# Patient Record
Sex: MALE | Race: WHITE
[De-identification: names, ages, dates, MRNs, and addresses within clinical notes are randomized per-mention and may not be internally consistent; named-entity substitution may affect disease eponyms.]

---

## 2020-01-16 ENCOUNTER — HOSPITAL ENCOUNTER (INPATIENT)
Dept: HOSPITAL 95 - ER | Age: 80
LOS: 5 days | Discharge: HOME HEALTH | DRG: 917 | End: 2020-01-21
Attending: INTERNAL MEDICINE | Admitting: INTERNAL MEDICINE
Payer: MEDICARE

## 2020-01-16 VITALS — WEIGHT: 189.6 LBS | BODY MASS INDEX: 28.73 KG/M2 | HEIGHT: 68 IN

## 2020-01-16 DIAGNOSIS — R31.9: ICD-10-CM

## 2020-01-16 DIAGNOSIS — N40.1: ICD-10-CM

## 2020-01-16 DIAGNOSIS — N17.9: ICD-10-CM

## 2020-01-16 DIAGNOSIS — R25.1: ICD-10-CM

## 2020-01-16 DIAGNOSIS — T45.2X1A: Primary | ICD-10-CM

## 2020-01-16 DIAGNOSIS — E78.5: ICD-10-CM

## 2020-01-16 DIAGNOSIS — E83.52: ICD-10-CM

## 2020-01-16 DIAGNOSIS — R53.1: ICD-10-CM

## 2020-01-16 DIAGNOSIS — G93.41: ICD-10-CM

## 2020-01-16 DIAGNOSIS — N28.9: ICD-10-CM

## 2020-01-16 DIAGNOSIS — I35.0: ICD-10-CM

## 2020-01-16 LAB
ALBUMIN SERPL BCP-MCNC: 3.4 G/DL (ref 3.4–5)
ALBUMIN/GLOB SERPL: 1.1 {RATIO} (ref 0.8–1.8)
ALT SERPL W P-5'-P-CCNC: 29 U/L (ref 12–78)
AST SERPL W P-5'-P-CCNC: 29 U/L (ref 12–37)
BILIRUB DIRECT SERPL-MCNC: 0.2 MG/DL (ref 0–0.3)
BILIRUB INDIRECT SERPL-MCNC: 0.4 MG/DL (ref 0.1–0.7)
BILIRUB SERPL-MCNC: 0.6 MG/DL (ref 0.1–1)
GLOBULIN SER CALC-MCNC: 3.2 G/DL (ref 2.2–4)
LEUKOCYTE ESTERASE UR QL STRIP: (no result)
MAGNESIUM SERPL-MCNC: 2.7 MG/DL (ref 1.6–2.4)
PHOSPHATE SERPL-MCNC: 3.2 MG/DL (ref 2.5–4.9)
PROT SERPL-MCNC: 6.6 G/DL (ref 6.4–8.2)
PROT UR STRIP-MCNC: (no result) MG/DL
PSA SERPL-MCNC: 6.88 NG/ML (ref 0–4)
RBC #/AREA URNS HPF: (no result) /HPF (ref 0–2)
SP GR SPEC: 1.01 (ref 1–1.02)
UROBILINOGEN UR STRIP-MCNC: (no result) MG/DL

## 2020-01-16 PROCEDURE — G0008 ADMIN INFLUENZA VIRUS VAC: HCPCS

## 2020-01-16 PROCEDURE — A9270 NON-COVERED ITEM OR SERVICE: HCPCS

## 2020-01-16 PROCEDURE — G0103 PSA SCREENING: HCPCS

## 2020-01-16 NOTE — NUR
BED EXIT ALARM. PATIENT CONFUSED AND FIXED ON GETTING UP TO USE BR. PATIENT
REMINDED TO STAY IN BED. URINAL PROVIDED FOR BED AND PATIENT USING. ALSO
PULLED OFF GOWN AND TELEMETRY LEADS AND BOTH REPLACED. PATIENT NOT SURE WHERE
HIS WIFE WENT TO AND HE PREVIOUSLY TOLD ME SHE WENT HOME. INCREASED CONFUSION
AT THIS TIME. BED ALARM ACTIVATED AND CALL LIGHT IN REACH.

## 2020-01-16 NOTE — NUR
PATIENT WAS A NEW ADMIT FROM ED ON DAY SHIFT CHANGE. AXOX 3 WITH CONFUSION.
PATIENT ALREADY IN BED. BEDREST WITH WEAKNESS AND BASELINE HAND AND LEG
TREMORS. FAMILY PRESENT ON ADMIT. PATIENT HAD MULTIPLE BM'S ON ADMIT. NS
INFUSING  mL/HR FROM ED. REPORTED BACK PAIN AND TYLENOL GIVEN PER EMAR.
TELEMETRY PLACED AND TECH REPORTS ST W/PVC . BED ALARM ACTIVATED FOR
IMPULSIVENESS AND HX OF FALLS. FAMILY/PATIENT ORIENTED TO ROOM AND CALL LIGHT
SYSTEM. WILL CONTINUE TO MONITOR.

## 2020-01-16 NOTE — NUR
CONFUSED AND PULLED TELEMETRY AND GOWN OFF. REPLACING BOTH. NOT ABLE TO
REORIENT AT THIS TIME. WILL CONTINUE TO MONITOR

## 2020-01-17 LAB
ALBUMIN SERPL BCP-MCNC: 3.3 G/DL (ref 3.4–5)
ANION GAP SERPL CALCULATED.4IONS-SCNC: 10 MMOL/L (ref 6–16)
ANION GAP SERPL CALCULATED.4IONS-SCNC: 8 MMOL/L (ref 6–16)
BASOPHILS # BLD AUTO: 0.01 K/MM3 (ref 0–0.23)
BASOPHILS NFR BLD AUTO: 0 % (ref 0–2)
BUN SERPL-MCNC: 51 MG/DL (ref 8–24)
BUN SERPL-MCNC: 53 MG/DL (ref 8–24)
CALCIUM SERPL-MCNC: 11.9 MG/DL (ref 8.5–10.1)
CALCIUM SERPL-MCNC: 12.6 MG/DL (ref 8.5–10.1)
CHLORIDE SERPL-SCNC: 111 MMOL/L (ref 98–108)
CHLORIDE SERPL-SCNC: 113 MMOL/L (ref 98–108)
CO2 SERPL-SCNC: 19 MMOL/L (ref 21–32)
CO2 SERPL-SCNC: 20 MMOL/L (ref 21–32)
CREAT SERPL-MCNC: 4.98 MG/DL (ref 0.6–1.2)
CREAT SERPL-MCNC: 5.17 MG/DL (ref 0.6–1.2)
CREAT UR-MCNC: 44 MG/DL (ref 27–270)
DEPRECATED RDW RBC AUTO: 48.4 FL (ref 35.1–46.3)
EOSINOPHIL # BLD AUTO: 0.01 K/MM3 (ref 0–0.68)
EOSINOPHIL NFR BLD AUTO: 0 % (ref 0–6)
ERYTHROCYTE [DISTWIDTH] IN BLOOD BY AUTOMATED COUNT: 13.7 % (ref 11.7–14.2)
GLUCOSE SERPL-MCNC: 119 MG/DL (ref 70–99)
GLUCOSE SERPL-MCNC: 134 MG/DL (ref 70–99)
HCT VFR BLD AUTO: 36.8 % (ref 37–53)
HGB BLD-MCNC: 12 G/DL (ref 13.5–17.5)
IMM GRANULOCYTES # BLD AUTO: 0.04 K/MM3 (ref 0–0.1)
IMM GRANULOCYTES NFR BLD AUTO: 1 % (ref 0–1)
LEUKOCYTE ESTERASE UR QL STRIP: (no result)
LYMPHOCYTES # BLD AUTO: 1.04 K/MM3 (ref 0.84–5.2)
LYMPHOCYTES NFR BLD AUTO: 13 % (ref 21–46)
MCHC RBC AUTO-ENTMCNC: 32.6 G/DL (ref 31.5–36.5)
MCV RBC AUTO: 97 FL (ref 80–100)
MONOCYTES # BLD AUTO: 0.54 K/MM3 (ref 0.16–1.47)
MONOCYTES NFR BLD AUTO: 7 % (ref 4–13)
NEUTROPHILS # BLD AUTO: 6.44 K/MM3 (ref 1.96–9.15)
NEUTROPHILS NFR BLD AUTO: 80 % (ref 41–73)
NRBC # BLD AUTO: 0 K/MM3 (ref 0–0.02)
NRBC BLD AUTO-RTO: 0 /100 WBC (ref 0–0.2)
PHOSPHATE SERPL-MCNC: 3.4 MG/DL (ref 2.5–4.9)
PLATELET # BLD AUTO: 197 K/MM3 (ref 150–400)
POTASSIUM SERPL-SCNC: 4.2 MMOL/L (ref 3.5–5.5)
POTASSIUM SERPL-SCNC: 4.3 MMOL/L (ref 3.5–5.5)
PROT UR-MCNC: 17.8 MG/DL (ref 0–11.9)
SODIUM SERPL-SCNC: 139 MMOL/L (ref 136–145)
SODIUM SERPL-SCNC: 142 MMOL/L (ref 136–145)
SP GR SPEC: 1.01 (ref 1–1.02)
UROBILINOGEN UR STRIP-MCNC: (no result) MG/DL
WBC #/AREA URNS HPF: (no result) /HPF (ref 0–5)

## 2020-01-17 NOTE — NUR
BED EXIT ALARM AND PATIENT OUT OF BED WALKING. PULLED TELE LEADS OFF AND GOWN.
PATIENT BACK IN BED AND LEADS AND GOWN REPLACED. NOT ABLE TO ORIENT AT THIS
TIME. INCREASED CONFUSION. BED ALARM ACTIVATED.

## 2020-01-17 NOTE — NUR
SHIFT SUMMARY
 
PATIENT IS PLEASANT, VERY CONFUSED, PATIENTS FAMILY MEMBERS IN WITH THE
PATIENT AT THIS TIME. SHE IS VERY HELPFUL. HAVE TO KEEP THE PATIENT'S IV
WRAPPED AS AN "OUT OF SIGHT OUT OF MIND" SITUATION. SPOKE WITH DR. ELLIOTT
ABOUT HIS RED URINE. HE STATES THE ONLY THING WE CAN DO IS WATCH THE PATIENT'S
URINE AT THIS TIME. NO ACUTE CONCERNS, PATIENT WILL BE HERE AND AWAITING TEST
RESULTS. EXPECTED STAY UNTIL AT LEAST MONDAY.

## 2020-01-17 NOTE — NUR
Patient arrived to room 345 with family present.  Alert to self and family. no
complaints of discomfort. Able to verbalize 2 patient identifiers.

## 2020-01-17 NOTE — NUR
BED EXIT ALARM AND PATIENT ON SIDE OF BED. PULLED TELEMETRY OFF AND GOWN.
INCREASE AGITATION NOTED. PATIENT URINARY URGENCY WITH LASIX GIVEN IN ED.
PATIENT BACK IN BED AND TELE/GOWN REPLACED. USING URINAL IN BED. ATTENDS IN
PLACE. BED ALARM ACTIVATED. WILL CONTINUE TO MONITOR.

## 2020-01-17 NOTE — NUR
SHIFT SUMMARY
PATIENT HAVING INCREASED CONFUSION AND AGITATION. AXOX 2 AND BEDREST. BED EXIT
ALARM X FOUR PULLING TELE LEADS AND GOWN OFF EACH TIME. PIV REMAINS INTACT.
TELE TECH REPORTS ST W/PVC . BASELINE HANDS/LEGS TREMORS. REPORTED
CHRONIC BACK PAIN AND TYLENOL GIVEN PER EMAR. FAMILY REPORTED INCREASE
CONFUSION AND TREMORS SIX PLUS MONTHS AGO. CONSULT CALLED INTO DR WINKLER
ANSWERING SERVICE FOR AM. TAKES MEDICATION WHOLE WITH WATER. VSS/AFEBRILE.
DENIES SOB AND N/V. NS INFUSING  mL/HR AND HELD WHEN PATIENT CONFUSED
AND PULLING AT LINES, CORDS AND PIV. CALL LIGHT IN REACH. BED IN LOWEST
POSITION AND ALARM ACTIVATED. WILL CONTINUE TO MONITOR UNTIL DAY SHIFT NURSE
ASSUMES CARE.

## 2020-01-17 NOTE — NUR
PT IS REALLY HAVING DIFFICULTIES URINATING ON THE URINAL, STANDING AT BEDSIDE
AND UNSTEADY. CNA WOULD LIKE TO GET HIM IN THE SHOWER WITH A CHAIR, BUT
BEDBATH IS GOOD TOO.

## 2020-01-17 NOTE — NUR
pt tranferred to room 345 with ULISES Newsome assuming care. Family at bedside and
supportive, pagan patent draining blood tinged urine.

## 2020-01-18 LAB
ALBUMIN SERPL BCP-MCNC: 3.1 G/DL (ref 3.4–5)
ANION GAP SERPL CALCULATED.4IONS-SCNC: 9 MMOL/L (ref 6–16)
BASOPHILS # BLD AUTO: 0 K/MM3 (ref 0–0.23)
BASOPHILS NFR BLD AUTO: 0 % (ref 0–2)
BUN SERPL-MCNC: 47 MG/DL (ref 8–24)
CALCIUM SERPL-MCNC: 10.9 MG/DL (ref 8.5–10.1)
CHLORIDE SERPL-SCNC: 114 MMOL/L (ref 98–108)
CO2 SERPL-SCNC: 19 MMOL/L (ref 21–32)
CREAT SERPL-MCNC: 4.55 MG/DL (ref 0.6–1.2)
DEPRECATED RDW RBC AUTO: 48.4 FL (ref 35.1–46.3)
EOSINOPHIL # BLD AUTO: 0.01 K/MM3 (ref 0–0.68)
EOSINOPHIL NFR BLD AUTO: 0 % (ref 0–6)
ERYTHROCYTE [DISTWIDTH] IN BLOOD BY AUTOMATED COUNT: 13.8 % (ref 11.7–14.2)
GLUCOSE SERPL-MCNC: 107 MG/DL (ref 70–99)
HCT VFR BLD AUTO: 31.1 % (ref 37–53)
HGB BLD-MCNC: 10.3 G/DL (ref 13.5–17.5)
IMM GRANULOCYTES # BLD AUTO: 0.03 K/MM3 (ref 0–0.1)
IMM GRANULOCYTES NFR BLD AUTO: 0 % (ref 0–1)
LYMPHOCYTES # BLD AUTO: 0.93 K/MM3 (ref 0.84–5.2)
LYMPHOCYTES NFR BLD AUTO: 14 % (ref 21–46)
MCHC RBC AUTO-ENTMCNC: 33.1 G/DL (ref 31.5–36.5)
MCV RBC AUTO: 97 FL (ref 80–100)
MONOCYTES # BLD AUTO: 0.56 K/MM3 (ref 0.16–1.47)
MONOCYTES NFR BLD AUTO: 8 % (ref 4–13)
NEUTROPHILS # BLD AUTO: 5.3 K/MM3 (ref 1.96–9.15)
NEUTROPHILS NFR BLD AUTO: 78 % (ref 41–73)
NRBC # BLD AUTO: 0 K/MM3 (ref 0–0.02)
NRBC BLD AUTO-RTO: 0 /100 WBC (ref 0–0.2)
PHOSPHATE SERPL-MCNC: 3.4 MG/DL (ref 2.5–4.9)
PLATELET # BLD AUTO: 150 K/MM3 (ref 150–400)
POTASSIUM SERPL-SCNC: 3.9 MMOL/L (ref 3.5–5.5)
SODIUM SERPL-SCNC: 142 MMOL/L (ref 136–145)

## 2020-01-18 NOTE — NUR
NIGHT SHIFT SUMMARY
slept well first half of shift.  Had somewhat difficulty with patient pulling
at pagan when he was sleeping. The patient calls appropriately using call
light, but did occasionaly have difficulties following commands. As an
example, he would grasp the rails extremely when getting repositioned. Almost
looking frightened.  minor complaints of pain in low back which was relieved
with tramadol.

## 2020-01-18 NOTE — NUR
SUMMARY
 
PT RESTING QUIETLY IN BED, FAMILY AT THE BEDSIDE, THEY HAVE BEEN WITH THE PT
FOR MOST OF THE DAY, PT WAKES EASILY, HAS BEEN UP TO THE CHAIR FOR MEALS, CONT
TO BE MOSTLY CONFUSED, MED PER EMAR FOR PAIN, URINE IN THE CATHETER WAS PINK
AT THE START OF THE SHIFT, IT HAS CLEARED AND IS YELLOW NOW, PT/OT HAVE WORKED
WITH THE PT AND ARE RECOMMENDING SNF, FAMILY IS AGREEABLE AT THIS TIME, VSS,
NO ACUTE CHANGES, WILL CONT TO MONITOR

## 2020-01-19 LAB
ALBUMIN SERPL BCP-MCNC: 2.6 G/DL (ref 3.4–5)
ALBUMIN SERPL BCP-MCNC: 2.8 G/DL (ref 3.4–5)
ANION GAP SERPL CALCULATED.4IONS-SCNC: 10 MMOL/L (ref 6–16)
ANION GAP SERPL CALCULATED.4IONS-SCNC: 8 MMOL/L (ref 6–16)
BUN SERPL-MCNC: 50 MG/DL (ref 8–24)
BUN SERPL-MCNC: 51 MG/DL (ref 8–24)
CALCIUM SERPL-MCNC: 10.3 MG/DL (ref 8.5–10.1)
CALCIUM SERPL-MCNC: 10.4 MG/DL (ref 8.5–10.1)
CHLORIDE SERPL-SCNC: 112 MMOL/L (ref 98–108)
CHLORIDE SERPL-SCNC: 114 MMOL/L (ref 98–108)
CO2 SERPL-SCNC: 19 MMOL/L (ref 21–32)
CO2 SERPL-SCNC: 21 MMOL/L (ref 21–32)
CREAT SERPL-MCNC: 3.91 MG/DL (ref 0.6–1.2)
CREAT SERPL-MCNC: 3.98 MG/DL (ref 0.6–1.2)
GLUCOSE SERPL-MCNC: 123 MG/DL (ref 70–99)
GLUCOSE SERPL-MCNC: 139 MG/DL (ref 70–99)
PHOSPHATE SERPL-MCNC: 2.4 MG/DL (ref 2.5–4.9)
PHOSPHATE SERPL-MCNC: 3.1 MG/DL (ref 2.5–4.9)
POTASSIUM SERPL-SCNC: 4.1 MMOL/L (ref 3.5–5.5)
POTASSIUM SERPL-SCNC: 4.4 MMOL/L (ref 3.5–5.5)
SODIUM SERPL-SCNC: 141 MMOL/L (ref 136–145)
SODIUM SERPL-SCNC: 143 MMOL/L (ref 136–145)

## 2020-01-19 NOTE — NUR
SUMMARY
 
PT SITTING UP IN BED EATING DINNER WITH THE ASSISTANCE OF HIS SPOUSE, PT HAS
BEEN UP IN THE CHAIR TODAY, IS UP WITH 1-2 P ASSIST, PT WITH SOME TREMORS, MED
PER EMAR FOR PAIN, PT'S CATHETER WITH CLEAR URINE, NOT PINK TINGED TODAY, VSS,
NO ACUTE CHANGES, WILL CONT TO MONITOR

## 2020-01-19 NOTE — NUR
SHIFT SUMMARY:
VSS EXCEPT PT TACHYCARDIC . APICAL PULSE MANUALLY RECHECKED 100. PT
DENIES PAIN. A/O X 1. SOB WITH EXERTION. 02 SATS 90-93% ON RA. COUGHED WITH
PILLS BUT NOT WITH FLUIDS TONIGHT. PT PREFERS TO SLEEP WITH HOB ELEVATED >45
DEGREES.  SLIDES DOWN IN BED FREQUENTLY AND IN HIS CONFUSION, TRIES TO CRAWL
OUT OF THE SIDE OF THE BED. BECOMES AGITATED WHEN ATTEMPTS ARE MADE TO
REDIRECT BUT NO AGGRESSION. PT VERY THIRSTY, GULPS DOWN FLUIDS THROUGH STRAW
EACH TIME STAFF ARE IN WITH HIM. HAS SLEPT LITTLE. ULTRAM GIVEN FOR BACK PAIN
AT START OF SHIFT. PT CURRENTLY DENYING PAIN. FC PATENT, DRAINING CLEAR YELLOW
URINE AT START OF SHIFT, BUT PT PULLED ON TUBING AND URINE BECAME PINK TINGED.
BED LOW, CALL BUTTON IN REACH, BED ALARM ON.

## 2020-01-20 LAB
ANION GAP SERPL CALCULATED.4IONS-SCNC: 8 MMOL/L (ref 6–16)
BASOPHILS # BLD AUTO: 0.02 K/MM3 (ref 0–0.23)
BASOPHILS NFR BLD AUTO: 0 % (ref 0–2)
BUN SERPL-MCNC: 50 MG/DL (ref 8–24)
C3 SERPL-MCNC: 101 MG/DL (ref 82–167)
C4 SERPL-MCNC: 17 MG/DL (ref 14–44)
CALCIUM SERPL-MCNC: 10.5 MG/DL (ref 8.5–10.1)
CHLORIDE SERPL-SCNC: 114 MMOL/L (ref 98–108)
CO2 SERPL-SCNC: 20 MMOL/L (ref 21–32)
CREAT SERPL-MCNC: 3.81 MG/DL (ref 0.6–1.2)
DEPRECATED RDW RBC AUTO: 50.6 FL (ref 35.1–46.3)
EOSINOPHIL # BLD AUTO: 0.05 K/MM3 (ref 0–0.68)
EOSINOPHIL NFR BLD AUTO: 1 % (ref 0–6)
ERYTHROCYTE [DISTWIDTH] IN BLOOD BY AUTOMATED COUNT: 14.4 % (ref 11.7–14.2)
FERRITIN SERPL-MCNC: 244 NG/ML (ref 26–388)
GLUCOSE SERPL-MCNC: 98 MG/DL (ref 70–99)
HCT VFR BLD AUTO: 28.3 % (ref 37–53)
HGB BLD-MCNC: 9.2 G/DL (ref 13.5–17.5)
IMM GRANULOCYTES # BLD AUTO: 0.05 K/MM3 (ref 0–0.1)
IMM GRANULOCYTES NFR BLD AUTO: 1 % (ref 0–1)
LYMPHOCYTES # BLD AUTO: 1.03 K/MM3 (ref 0.84–5.2)
LYMPHOCYTES NFR BLD AUTO: 12 % (ref 21–46)
MAGNESIUM SERPL-MCNC: 1.8 MG/DL (ref 1.6–2.4)
MCHC RBC AUTO-ENTMCNC: 32.5 G/DL (ref 31.5–36.5)
MCV RBC AUTO: 97 FL (ref 80–100)
MONOCYTES # BLD AUTO: 0.52 K/MM3 (ref 0.16–1.47)
MONOCYTES NFR BLD AUTO: 6 % (ref 4–13)
NEUTROPHILS # BLD AUTO: 7.01 K/MM3 (ref 1.96–9.15)
NEUTROPHILS NFR BLD AUTO: 81 % (ref 41–73)
NRBC # BLD AUTO: 0 K/MM3 (ref 0–0.02)
NRBC BLD AUTO-RTO: 0 /100 WBC (ref 0–0.2)
PHOSPHATE SERPL-MCNC: 2.7 MG/DL (ref 2.5–4.9)
PLATELET # BLD AUTO: 134 K/MM3 (ref 150–400)
POTASSIUM SERPL-SCNC: 4.1 MMOL/L (ref 3.5–5.5)
SODIUM SERPL-SCNC: 142 MMOL/L (ref 136–145)
TIBC SERPL-MCNC: 193 UG/DL (ref 250–450)

## 2020-01-20 NOTE — NUR
Received verbal permission from patient's family to care for patient on
01/21/20. Patient was sleeping at the time.

## 2020-01-20 NOTE — NUR
SHIFT SUMMARY:
BETTER NIGHT TONIGHT. MOOD IMPROVED. LESS AGITATION AND CONFUSION. PT TRIED TO
CRAWL OUT OF BED AND WAS DISCOVERED TO HAVE A WET BRIEF WITH F/C IN PLACE.
CATH PATENT AND DRAINING YELLOW URINE. 300 CC'S URINE OUT IN FC AFTER WET
BRIEF INCIDENT. CATH APPEARS TO BE PLACED PROPERLY. DENIES PAIN. SLEPT MOST OF
THE NIGHT. NO ACUTE CHANGES. BED LOW, CALL BUTTON IN REACH, BED ALARM ON.

## 2020-01-20 NOTE — NUR
*HUMPHREY REMOVED*
PT CONTINUED TO PULL AT HUMPHREY DUE TO PAIN/ DISCOMFORT. HUMPHREY REMOVED BY THIS
RN BEFORE PT PULLED IT OUT AND INJURING SELF. ATTENDS HAS BEEN PLACED. PT HAS
BEEN VOIDING IN URINAL AND SOME INCOTIENT EPISODES. URINE IS STILL PINK. WILL
CONTINUE TO MONITOR. PT REPORTS INCREASE IN COMFORT.

## 2020-01-20 NOTE — NUR
PT AOX2 AND COOPERATIVE OF CARE TODAY. PT DOES HAVE CONFUSION, BUT HAS BEEN
PLEASANT ALL CRISTINA. PT TALKING AND APPROPRIATE WITH CARE. PT UP IN CHAIR AND UP
TO BEDSIDE COMMODE AS A 1 PERSON ASSIST WITH GAIT BELT AND WALKER. PT'S HUMPHREY
HAS BEEN RED OUTPUT ALL DAY TODAY. PT STARTED TO FEEL SOME DISCOMFORT AND DR WINKLER ORDERED HUMPHREY TO BE FLUSHED AND THEN MONITOR COLOR OF URINE WELL. IF
URINE CLEAR FOR TWO HOURS HUMPHREY IS TO BE REMOVED. PT TOLERATED FLUSH WELL AND
HUMPHREY APPREARED TO HAVE SOME CLOTS AS IT BEGAN TO FLOW BETTER. PT STATES IT
BURNS A BIT, BUT PT WILL PULL ON IT OFF AND ON. PT RESTING IN BED AT THIS TIME
WILL CONTINUE TO MONITOR.

## 2020-01-20 NOTE — NUR
Initial Visit:
 
Palliative Care Consult for AD/POLST.
 
Spoke with [facility] Jimenez earlier this AM, discussed case and listened to
concerns.
 
Pt resting in bed and denies pain at this time. Pt is A&OX2. Pt unable to give
appropriate reason for hospital stay and current year. Pt denies dyspnea at
this time.
 
Pt's wife Klarissa and Pt's daughter Suad are at bedside. During visit Pt
appears mildly anxious and intermittently pushes linen down to observe his
Ca Catheter. Pt reports intermittent burning of catheter. Bedside RN Jaja
aware with her educating Pt and family regarding discomfort and Ca
Catheter. Engaged in therapeutic discussion regarding AD/POLST and some
advanced care planning. Discussed current code status with Pt's wife
confirming Pt's wishes to not receive CPR and does not want to be intubated.
Discussed completing a POLST and educated on life sustaining measures, risk
factors, and medical interventions to consider when completing POLST. Wife
Klairssa reports she is still trying to process information from Hospitalist and
cardiac specialist. She reports she will consider completing at a later time.
 
Pt's daughter Suad report her dad (Pt) and mom recently moved to [place] from
[place] and lived off the [facility] for 18 years. Pt's wife Klarissa report just her and
Pt living together with Klarissa being the primary caregiver for Pt. Educated on
the importance of planinng for the future as disease process takes its coarse.
Discussed the importance of establishing with a PCP and the importance for
routine discussions regarding disease process in order to plan accordingly.
Discussed the possibility of needing additional assistance with Pt care needs
in the home or higher level of care as disease process takes its coarse.
Provided Palliative Care contact information and instructed to call with any
questions or concerns. Family expressess appreciaiton of visit.
 
Palliative Care will remain available.

## 2020-01-21 LAB
ALBUMIN SERPL BCP-MCNC: 2.5 G/DL (ref 3.4–5)
ALBUMIN SERPL-MCNC: 3.1 G/DL (ref 2.9–4.4)
ALBUMIN/GLOB SERPL: 1.2 {RATIO} (ref 0.7–1.7)
ALPHA1 GLOB SERPL ELPH-MCNC: 0.3 G/DL (ref 0–0.4)
ALPHA2 GLOB SERPL ELPH-MCNC: 0.9 G/DL (ref 0.4–1)
ANION GAP SERPL CALCULATED.4IONS-SCNC: 8 MMOL/L (ref 6–16)
ANTI-DSDNA ANTIBODIES: <1 IU/ML (ref 0–9)
B-GLOBULIN SERPL ELPH-MCNC: 0.7 G/DL (ref 0.7–1.3)
BUN SERPL-MCNC: 52 MG/DL (ref 8–24)
CALCIUM SERPL-MCNC: 10.1 MG/DL (ref 8.5–10.1)
CHLORIDE SERPL-SCNC: 113 MMOL/L (ref 98–108)
CO2 SERPL-SCNC: 21 MMOL/L (ref 21–32)
CREAT SERPL-MCNC: 3.36 MG/DL (ref 0.6–1.2)
ENA RNP AB SER-ACNC: <0.2 AI (ref 0–0.9)
ENA SM AB SER-ACNC: <0.2 AI (ref 0–0.9)
ENA SS-A AB SER-ACNC: <0.2 AI (ref 0–0.9)
ENA SS-B AB SER-ACNC: <0.2 AI (ref 0–0.9)
GAMMA GLOB SERPL ELPH-MCNC: 0.8 G/DL (ref 0.4–1.8)
GLOBULIN SER CALC-MCNC: 2.7 G/DL (ref 2.2–3.9)
GLUCOSE SERPL-MCNC: 98 MG/DL (ref 70–99)
HCT VFR BLD AUTO: 26.1 % (ref 37–53)
HGB BLD-MCNC: 8.8 G/DL (ref 13.5–17.5)
IGG SERPL-MCNC: 846 MG/DL (ref 700–1600)
IGM SERPL-MCNC: 56 MG/DL (ref 15–143)
PHOSPHATE SERPL-MCNC: 1.9 MG/DL (ref 2.5–4.9)
POTASSIUM SERPL-SCNC: 4.1 MMOL/L (ref 3.5–5.5)
PROT SERPL-MCNC: 5.8 G/DL (ref 6–8.5)
SODIUM SERPL-SCNC: 142 MMOL/L (ref 136–145)

## 2020-01-21 NOTE — NUR
PT DISCHARGED HOME WITH HOME HEALTH. PT DOING WELL TODAY AND HAS BEEN VOIDING
WELL. URINE STILL HAS LIGHTENED UP, BUT IS STILL RED. PT HAD FAMILY TO
TRANSPORT HOME. OT AND PHYSICAL THERAPY WERE ABLE TO WORK WITH PT AND HIS WIFE
ON SAFE TRANSFERING AT HOME. ALL PAPERS REVIEWED AND EDUCATIONAL MATERIAL SENT
HOME. NO DISTRESS NOTED. IVs REMOVED PRIOR TO DISCHARGE. THIS WRITER ESCORTED
PT DOWN TO N ENTRANCE VIA WHEEL CHAIR.

## 2020-01-21 NOTE — NUR
Spoke with [facility], discussed
case and plan. Plan for Pt to discharge today with home health.
 
Pt resting in bed upon arrival. Pt's wife Klarissa at bedside. Discussed plan and
Klarissa is agreeable. She reports Pt does not want to go to SNF and preferrs
home health. Educated Klarissa on the possibility of Pt continuing being a fall
risk. Re-enforced education on disease process. No other concerns reported at
this time. Klarissa expresses appreciation of visit.
 
Palliative Care will remain available.

## 2020-01-21 NOTE — NUR
SHIFT SUMMARY
PT HUMPHREY REMOVED DUE TO DISCOMFORT AND PT REPEATEDLY TRYING TO REMOVE IT. PT
HAS BEEN VOIDING WITH HELP IN URINAL. PT IS HAVING SOME INCONTIENCE NOTED. PT
HAS BEEN SLEEPING OFF AND ON. PT HAD SOME BM NOTED AS WELL. PT HAD NO
COMPLAINTS SINCE HUMPHREY REMOVED. PT HAS LIGHT PINK URINE NOTED AND IS
IMPROVING. PT CURRENTLY SLEEPING AND IN NO DISTRESS. CALL LIGHT IN REACH AND
BED ALARM ON.

## 2020-01-22 LAB
ANTIPROTEINASE 3 (PR-3) ABS: <3.5 U/ML (ref 0–3.5)
ATYPICAL PANCA: (no result) TITER
CYTOPLASMIC (C-ANCA): (no result) TITER
MYELOPEROXIDASE AB SER IA-ACNC: <9 U/ML (ref 0–9)
PERINUCLEAR (P-ANCA): (no result) TITER

## 2020-06-22 ENCOUNTER — HOSPITAL ENCOUNTER (OUTPATIENT)
Dept: HOSPITAL 95 - MHTC | Age: 80
Setting detail: OBSERVATION
LOS: 1 days | Discharge: HOME | End: 2020-06-23
Attending: INTERNAL MEDICINE | Admitting: INTERNAL MEDICINE
Payer: MEDICARE

## 2020-06-22 VITALS — BODY MASS INDEX: 30.76 KG/M2 | HEIGHT: 67.01 IN | WEIGHT: 195.99 LBS

## 2020-06-22 DIAGNOSIS — Z87.891: ICD-10-CM

## 2020-06-22 DIAGNOSIS — Z01.810: Primary | ICD-10-CM

## 2020-06-22 DIAGNOSIS — Z79.899: ICD-10-CM
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
## 2020-06-22 DIAGNOSIS — E78.5: ICD-10-CM

## 2020-06-22 DIAGNOSIS — E83.52: ICD-10-CM

## 2020-06-22 DIAGNOSIS — I25.10: ICD-10-CM

## 2020-06-22 DIAGNOSIS — I10: ICD-10-CM

## 2020-06-22 DIAGNOSIS — Z79.82: ICD-10-CM

## 2020-06-22 DIAGNOSIS — I35.0: ICD-10-CM

## 2020-06-22 DIAGNOSIS — N17.9: ICD-10-CM

## 2020-06-22 DIAGNOSIS — T82.855A: ICD-10-CM

## 2020-06-22 LAB
LEUKOCYTE ESTERASE UR QL STRIP: (no result)
PROT UR STRIP-MCNC: (no result) MG/DL
RBC #/AREA URNS HPF: (no result) /HPF (ref 0–2)
SP GR SPEC: 1 (ref 1–1.02)
UROBILINOGEN UR STRIP-MCNC: (no result) MG/DL

## 2020-06-22 PROCEDURE — G0378 HOSPITAL OBSERVATION PER HR: HCPCS

## 2020-06-22 PROCEDURE — C1769 GUIDE WIRE: HCPCS

## 2020-06-22 PROCEDURE — C1887 CATHETER, GUIDING: HCPCS

## 2020-06-22 PROCEDURE — C1874 STENT, COATED/COV W/DEL SYS: HCPCS

## 2020-06-22 PROCEDURE — C1725 CATH, TRANSLUMIN NON-LASER: HCPCS

## 2020-06-22 PROCEDURE — C9600 PERC DRUG-EL COR STENT SING: HCPCS

## 2020-06-22 PROCEDURE — C1894 INTRO/SHEATH, NON-LASER: HCPCS

## 2020-06-22 PROCEDURE — G0278 ILIAC ART ANGIO,CARDIAC CATH: HCPCS

## 2020-06-22 NOTE — NUR
1525 DR. DOUGLAS AT THE BEDSIDE FOR GROIN EVALUATION. HUMPHREY CATHETER PLACED
AND THEN PREPPED TO PULL SHEATH. 350 ML+ UOP NOTED, CLEAR YELLOW URINE.

## 2020-06-22 NOTE — NUR
SHIFT SUMMARY:
Pt arrived to room pcu 13 around 1730. Pt and his wife oriented to room, unit,
call light, POC. Denies questions. Pt has hx underling dementia. Bed alarm
placed on and pt and wife educated. Pt Laying flat. R groin site with krista
patch in place. Fem stop in place but not inflated. At arrival to room pt had
a quarter size bloody cori on krista patch. Pt groin site soft and no
hematoma noted at that time. Pt did and continues to have oozing at pagan cath
insertion site. VSS since arrival to unit. Pt was given finger food tray and
was assisted with meal by his wife.
At this time pt is resting comfortably. Krista patch is now saturated but
still no hematoma noted. Physician was notified of this. No other needs or
concerns at this time. Report was given to night RN and care transfered.

## 2020-06-22 NOTE — NUR
1501 REVEIVED SBAR FROM ULISES MARTINEZ AT THE BEDSIDE, RIGHT GROIN SITE
EVALUATED. PREPARED TO PULL ACT. PATIENT WITH WIFE AT THE BEDSIDE. PATIENT
TRYING TO URINATE.

## 2020-06-22 NOTE — NUR
PT RECEIVED 10 MG IV HYDRALZINE FOR CONTINUED ELEVATED B/PS'.  PT'S SITE
REMAINS SWOLLEEN, BUT SOFT TRACK OOZING PRESENT, MAPPED ON KAYLEE.  PT DENIES
PAIN AT SITE.

## 2020-06-22 NOTE — NUR
PT CONTINUES TO HAVE SLOW 00ZE, BUT SITE UNCHANGED; LIGHT PRESSURE X 5 MIN,
KAYLEE AND TEGADERM CHANGED AND FEMSTOP PLACED-BUT NOT INFLATED. DR DOUGLAS NOTIFIED OF SLOW OOZE, OK WITH PLAN.

## 2020-06-22 NOTE — NUR
UPDATE
PATIENT'S KAYLEE PATCH TO RIGHT GROIN IS COMPLETLEY SOAKED IN BLOOD. KAYLEE
PATCH CHANGED OUT AT APPROX 2005. WILL CONTINUE TO MONITOR. VITAL SIGNS AS
CHARTED. PATIENT DENIES ANY PAIN OR NUMBNESS AND TINGLING AT THIS TIME.

## 2020-06-22 NOTE — NUR
UPDATE
[facility] LAB STATED THEY ALREADY HAD A UA IN THE
LAB FROM [facility] AND THEY WERE WAITING FOR THE ORDER TO BE COLLECTED.

## 2020-06-22 NOTE — NUR
1535 SHEATH PULLED AT 1535 AND KAYLEE PAD AND MANUAL PRESSURE HELD. WIFE TO
THE WAITING ROOM PRIOR TO SHEATH PULL. URINE DRAINING FROM THE HUMPHREY. MONITOR
ON Q 5 MINUTES VVS. PATIENT TOLERATING WELL.

## 2020-06-22 NOTE — NUR
MANUAL PRESSURE HELD FOR 25 MIN, R GROIN SITE SWOLLEEN, BUT SOFT, +2 PULSES X
2, KAYLEE AND TEGADERM DRSG APPLIED.

## 2020-06-22 NOTE — NUR
UPDATE
BLOOD IS STILL WITHIN THE OUTLINE. NO NEW BLEEDING NOTED AT THIS TIME. DOSE OF
ASPARIN GIVEN PER DR FISHER'S ORDERS SINCE PATIENT HAS NOT TAKEN MEDICATION PER
WIFE REPORT.

## 2020-06-22 NOTE — NUR
UPDATE
AT APPROX 2110 PATIENT'S KAYLEE PATCH WAS SOAKED THROUGH WITH BLOOD ONCE
AGAIN. KAYLEE PATCH REPLACED AND APPROX 10 MINUTES OF MANUAL PRESSURE WAS
HELD TO SITE. SPOT OF BLOOD ON SITE WAS OUTLINED AFTER PRESSURE WAS HELD.
APPROX 20 MINUTES AFTER PRESSURE WAS HELD SITE WAS CHECKED AND BLOOD HAD NOT
PASSED THE INITAL OUTLINE. DR FISHER NOTIFIED AND STATED TO RESECHULE THE PLAVIX
FOR 0900 TOMORROW MORNING AND TO GIVE ASPARIN DOSE IF PATIENT HAS NOT HAD IT
YET TODAY. IF HE HAS, HOLD TONIGHT'S DOSE.

## 2020-06-23 LAB
ANION GAP SERPL CALCULATED.4IONS-SCNC: 6 MMOL/L (ref 6–16)
BASOPHILS # BLD AUTO: 0.02 K/MM3 (ref 0–0.23)
BASOPHILS NFR BLD AUTO: 0 % (ref 0–2)
BUN SERPL-MCNC: 26 MG/DL (ref 8–24)
CALCIUM SERPL-MCNC: 9.7 MG/DL (ref 8.5–10.1)
CHLORIDE SERPL-SCNC: 113 MMOL/L (ref 98–108)
CO2 SERPL-SCNC: 25 MMOL/L (ref 21–32)
CREAT SERPL-MCNC: 1.24 MG/DL (ref 0.6–1.2)
DEPRECATED RDW RBC AUTO: 44 FL (ref 35.1–46.3)
EOSINOPHIL # BLD AUTO: 0.05 K/MM3 (ref 0–0.68)
EOSINOPHIL NFR BLD AUTO: 1 % (ref 0–6)
ERYTHROCYTE [DISTWIDTH] IN BLOOD BY AUTOMATED COUNT: 13.2 % (ref 11.7–14.2)
GLUCOSE SERPL-MCNC: 134 MG/DL (ref 70–99)
HCT VFR BLD AUTO: 35.7 % (ref 37–53)
HGB BLD-MCNC: 11.2 G/DL (ref 13.5–17.5)
IMM GRANULOCYTES # BLD AUTO: 0.01 K/MM3 (ref 0–0.1)
IMM GRANULOCYTES NFR BLD AUTO: 0 % (ref 0–1)
LYMPHOCYTES # BLD AUTO: 0.94 K/MM3 (ref 0.84–5.2)
LYMPHOCYTES NFR BLD AUTO: 16 % (ref 21–46)
MCHC RBC AUTO-ENTMCNC: 31.4 G/DL (ref 31.5–36.5)
MCV RBC AUTO: 91 FL (ref 80–100)
MONOCYTES # BLD AUTO: 0.67 K/MM3 (ref 0.16–1.47)
MONOCYTES NFR BLD AUTO: 11 % (ref 4–13)
NEUTROPHILS # BLD AUTO: 4.27 K/MM3 (ref 1.96–9.15)
NEUTROPHILS NFR BLD AUTO: 72 % (ref 41–73)
NRBC # BLD AUTO: 0 K/MM3 (ref 0–0.02)
NRBC BLD AUTO-RTO: 0 /100 WBC (ref 0–0.2)
PLATELET # BLD AUTO: 191 K/MM3 (ref 150–400)
POTASSIUM SERPL-SCNC: 3.9 MMOL/L (ref 3.5–5.5)
SODIUM SERPL-SCNC: 144 MMOL/L (ref 136–145)

## 2020-06-23 NOTE — NUR
PCU DISCHARGE SUMMARY
PATIENT ALERT AND ORIENTED TO SELF AND LOCATION - CONFUSED TO DATE/TIME AND
SITUATION. WIFE AT BEDSIDE - CAREGIVER TO PATIENT. WIFE IS VERY KNOWLEDGEABLE
REGARDING PATIENTS CARE AND HOME MEDICAITON REGIMENT. PATIENT REMAINS ON ROOM
AIR - SINUS TO SINUS MYA PER CARDIAC MONITOR.  RIGHT GROIN SITE NOTED -
ASSESSED WITH NOC SHIFT RN AT BEGINNING OF SHIFT (RINDY) NO CHANGE TO GROIN
SITE PER NOC SHIFT RN - SOFT AROUND SITE WITH NO S/SX OF HEMATOMA - MILD
BRUISING AND SOFT SWELLING NOTED. KAYLEE IN PLACE WITH TEGADERM OVER. NO NEW
BLEEDING NOTED. PATIENT AMBULATED DOWN ARROYO 100+ FEET, TOLERATED WELL WITH
CANE (BASELINE PER WIFE) PATIENT STAYED ANOTHER HOUR AFTER AMBULATION AND SITE
REMAINS INTACT WITH NO NEW BLEEDING OR HEMATOMA NOTED. HUMPHREY CATH PULLED.
PATIENT VOIDED YELLOW URINE POST HUMPHREY REMOVAL. PATIENT LEFT UNIT VIA
WHEELCHAIR HOME WITH WIFE. WIFE DENIES ANY QUESTIONS OR CONCERNS. WRITTEN AND
VERBAL POST GROIN SITE INSTRUCTIONS DISCUSSED AND GIVEN TO PATIENT. BELONGINGS
SENT WITH PATIENT.

## 2020-06-23 NOTE — NUR
SHIFT SUMMARY
PATIENT APPEARED TO BE AWAKE MOST OF THE NIGHT. PATIENT MOSTLY PLEASENT AND
COOPERATIVE WITH CARE THROUGHOUT THE NIGHT, HOWEVER, PATIENT DID GET
FRUSTRATED AND SLIGHTLY AGITATED DUE TO IT BEING "TOO NOISEY HERE." PATIENT
PROVIED WITH EAR PLUGS MULTIPLE TIMES THROUGHOUT THE NIGHT BUT APPEARS TO
LOSE THEM QUICKLY. PATIENT FIDGITY AND HAS PULLED OFF HIS TELE SEVERAL TIMES
TONIGHT. PATIENT'S RIGHT GROIN SITE IS SOFT TO THE TOUCH. RED DRAINAGE HAS
LEAKED SLIGHTLY PASSED THE ORIGINAL OUTLINE DRAWN ON THE KAYLEE PATCH AT
APPROX 2110 LAST NIGHT. PATIENT FORGETFUL ABOUT MOVEMENT RESTRICTIONS DUE TO
GROIN SITE. WIFE AT THE BEDSIDE THROUGHOUT THE NIGHT AND HELPED TO CALM
PATIENT WHEN HE BECAME AGITATED. WILL CONTINUE TO MONITOR PATIENT AND REPORT
TO ONCOMING RN.

## 2021-03-02 ENCOUNTER — HOSPITAL ENCOUNTER (EMERGENCY)
Dept: HOSPITAL 95 - ER | Age: 81
Discharge: HOME | End: 2021-03-02
Payer: MEDICARE

## 2021-03-02 VITALS — WEIGHT: 189.99 LBS | HEIGHT: 67 IN | BODY MASS INDEX: 29.82 KG/M2

## 2021-03-02 DIAGNOSIS — Z79.899: ICD-10-CM

## 2021-03-02 DIAGNOSIS — Z79.82: ICD-10-CM

## 2021-03-02 DIAGNOSIS — R10.31: Primary | ICD-10-CM

## 2021-03-02 DIAGNOSIS — Z79.02: ICD-10-CM

## 2021-03-02 DIAGNOSIS — I10: ICD-10-CM

## 2021-03-02 DIAGNOSIS — Z87.891: ICD-10-CM

## 2021-03-02 LAB
ALBUMIN SERPL BCP-MCNC: 4 G/DL (ref 3.4–5)
ALBUMIN/GLOB SERPL: 1.3 {RATIO} (ref 0.8–1.8)
ALT SERPL W P-5'-P-CCNC: 25 U/L (ref 12–78)
ANION GAP SERPL CALCULATED.4IONS-SCNC: 4 MMOL/L (ref 6–16)
AST SERPL W P-5'-P-CCNC: 15 U/L (ref 12–37)
BASOPHILS # BLD AUTO: 0.03 K/MM3 (ref 0–0.23)
BASOPHILS NFR BLD AUTO: 0 % (ref 0–2)
BILIRUB SERPL-MCNC: 0.5 MG/DL (ref 0.1–1)
BUN SERPL-MCNC: 20 MG/DL (ref 8–24)
CALCIUM SERPL-MCNC: 10.7 MG/DL (ref 8.5–10.1)
CHLORIDE SERPL-SCNC: 108 MMOL/L (ref 98–108)
CO2 SERPL-SCNC: 26 MMOL/L (ref 21–32)
CREAT SERPL-MCNC: 1.34 MG/DL (ref 0.6–1.2)
DEPRECATED RDW RBC AUTO: 45.8 FL (ref 35.1–46.3)
EOSINOPHIL # BLD AUTO: 0.03 K/MM3 (ref 0–0.68)
EOSINOPHIL NFR BLD AUTO: 0 % (ref 0–6)
ERYTHROCYTE [DISTWIDTH] IN BLOOD BY AUTOMATED COUNT: 14 % (ref 11.7–14.2)
GLOBULIN SER CALC-MCNC: 3 G/DL (ref 2.2–4)
GLUCOSE SERPL-MCNC: 99 MG/DL (ref 70–99)
HCT VFR BLD AUTO: 38.6 % (ref 37–53)
HGB BLD-MCNC: 12.8 G/DL (ref 13.5–17.5)
IMM GRANULOCYTES # BLD AUTO: 0.01 K/MM3 (ref 0–0.1)
IMM GRANULOCYTES NFR BLD AUTO: 0 % (ref 0–1)
LEUKOCYTE ESTERASE UR QL STRIP: (no result)
LYMPHOCYTES # BLD AUTO: 0.93 K/MM3 (ref 0.84–5.2)
LYMPHOCYTES NFR BLD AUTO: 12 % (ref 21–46)
MCHC RBC AUTO-ENTMCNC: 33.2 G/DL (ref 31.5–36.5)
MCV RBC AUTO: 89 FL (ref 80–100)
MONOCYTES # BLD AUTO: 0.74 K/MM3 (ref 0.16–1.47)
MONOCYTES NFR BLD AUTO: 10 % (ref 4–13)
NEUTROPHILS # BLD AUTO: 6.04 K/MM3 (ref 1.96–9.15)
NEUTROPHILS NFR BLD AUTO: 78 % (ref 41–73)
NRBC # BLD AUTO: 0 K/MM3 (ref 0–0.02)
NRBC BLD AUTO-RTO: 0 /100 WBC (ref 0–0.2)
PLATELET # BLD AUTO: 203 K/MM3 (ref 150–400)
POTASSIUM SERPL-SCNC: 4.5 MMOL/L (ref 3.5–5.5)
PROT SERPL-MCNC: 7 G/DL (ref 6.4–8.2)
PROT UR STRIP-MCNC: (no result) MG/DL
RBC #/AREA URNS HPF: (no result) /HPF (ref 0–2)
SODIUM SERPL-SCNC: 138 MMOL/L (ref 136–145)
SP GR SPEC: 1.02 (ref 1–1.02)
UROBILINOGEN UR STRIP-MCNC: (no result) MG/DL
WBC #/AREA URNS HPF: (no result) /HPF (ref 0–5)

## 2022-01-08 ENCOUNTER — HOSPITAL ENCOUNTER (EMERGENCY)
Dept: HOSPITAL 95 - ER | Age: 82
Discharge: HOME | End: 2022-01-08
Payer: MEDICARE

## 2022-01-08 VITALS — HEIGHT: 67 IN | BODY MASS INDEX: 31.39 KG/M2 | WEIGHT: 200 LBS
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
## 2022-01-08 DIAGNOSIS — I12.9: ICD-10-CM

## 2022-01-08 DIAGNOSIS — M54.50: ICD-10-CM

## 2022-01-08 DIAGNOSIS — F03.90: ICD-10-CM

## 2022-01-08 DIAGNOSIS — S22.089A: Primary | ICD-10-CM

## 2022-01-08 DIAGNOSIS — Z87.891: ICD-10-CM

## 2022-01-08 DIAGNOSIS — N18.9: ICD-10-CM

## 2022-01-08 PROCEDURE — A9270 NON-COVERED ITEM OR SERVICE: HCPCS

## 2022-03-31 ENCOUNTER — HOSPITAL ENCOUNTER (OUTPATIENT)
Dept: HOSPITAL 95 - ER | Age: 82
Setting detail: OBSERVATION
LOS: 131 days | Discharge: HOSPICE | End: 2022-08-09
Attending: STUDENT IN AN ORGANIZED HEALTH CARE EDUCATION/TRAINING PROGRAM | Admitting: STUDENT IN AN ORGANIZED HEALTH CARE EDUCATION/TRAINING PROGRAM
Payer: MEDICARE

## 2022-03-31 VITALS — WEIGHT: 192.46 LBS | HEIGHT: 67 IN | BODY MASS INDEX: 30.21 KG/M2

## 2022-03-31 DIAGNOSIS — F33.2: ICD-10-CM

## 2022-03-31 DIAGNOSIS — M25.511: ICD-10-CM

## 2022-03-31 DIAGNOSIS — N18.31: ICD-10-CM

## 2022-03-31 DIAGNOSIS — B95.2: ICD-10-CM

## 2022-03-31 DIAGNOSIS — D64.9: ICD-10-CM

## 2022-03-31 DIAGNOSIS — T17.918A: ICD-10-CM

## 2022-03-31 DIAGNOSIS — R45.851: ICD-10-CM

## 2022-03-31 DIAGNOSIS — K59.00: ICD-10-CM

## 2022-03-31 DIAGNOSIS — Z95.2: ICD-10-CM

## 2022-03-31 DIAGNOSIS — U07.1: ICD-10-CM

## 2022-03-31 DIAGNOSIS — N39.0: ICD-10-CM

## 2022-03-31 DIAGNOSIS — Z87.891: ICD-10-CM

## 2022-03-31 DIAGNOSIS — Z66: ICD-10-CM

## 2022-03-31 DIAGNOSIS — I35.0: ICD-10-CM

## 2022-03-31 DIAGNOSIS — Z79.82: ICD-10-CM

## 2022-03-31 DIAGNOSIS — F02.81: ICD-10-CM

## 2022-03-31 DIAGNOSIS — I12.9: ICD-10-CM

## 2022-03-31 DIAGNOSIS — N40.0: ICD-10-CM

## 2022-03-31 DIAGNOSIS — N17.9: ICD-10-CM

## 2022-03-31 DIAGNOSIS — G30.9: Primary | ICD-10-CM

## 2022-03-31 DIAGNOSIS — R25.1: ICD-10-CM

## 2022-03-31 LAB
ALBUMIN SERPL BCP-MCNC: 3.5 G/DL (ref 3.4–5)
ALBUMIN/GLOB SERPL: 1.1 {RATIO} (ref 0.8–1.8)
ALT SERPL W P-5'-P-CCNC: 26 U/L (ref 12–78)
ANION GAP SERPL CALCULATED.4IONS-SCNC: 8 MMOL/L (ref 6–16)
APAP SERPL-MCNC: <2 UG/ML (ref 10–30)
AST SERPL W P-5'-P-CCNC: 15 U/L (ref 12–37)
BASOPHILS # BLD AUTO: 0.03 K/MM3 (ref 0–0.23)
BASOPHILS NFR BLD AUTO: 1 % (ref 0–2)
BILIRUB SERPL-MCNC: 0.3 MG/DL (ref 0.1–1)
BUN SERPL-MCNC: 19 MG/DL (ref 8–24)
CALCIUM SERPL-MCNC: 10.6 MG/DL (ref 8.5–10.1)
CHLORIDE SERPL-SCNC: 109 MMOL/L (ref 98–108)
CO2 SERPL-SCNC: 24 MMOL/L (ref 21–32)
CREAT SERPL-MCNC: 1.21 MG/DL (ref 0.6–1.2)
DEPRECATED RDW RBC AUTO: 46.2 FL (ref 35.1–46.3)
EOSINOPHIL # BLD AUTO: 0.07 K/MM3 (ref 0–0.68)
EOSINOPHIL NFR BLD AUTO: 1 % (ref 0–6)
ERYTHROCYTE [DISTWIDTH] IN BLOOD BY AUTOMATED COUNT: 13.7 % (ref 11.7–14.2)
ETHANOL SERPL-MCNC: <3 MG/DL
FLUAV RNA SPEC QL NAA+PROBE: NEGATIVE
FLUBV RNA SPEC QL NAA+PROBE: NEGATIVE
GLOBULIN SER CALC-MCNC: 3.2 G/DL (ref 2.2–4)
GLUCOSE SERPL-MCNC: 113 MG/DL (ref 70–99)
HCT VFR BLD AUTO: 40 % (ref 37–53)
HGB BLD-MCNC: 13.1 G/DL (ref 13.5–17.5)
IMM GRANULOCYTES # BLD AUTO: 0.03 K/MM3 (ref 0–0.1)
IMM GRANULOCYTES NFR BLD AUTO: 1 % (ref 0–1)
LYMPHOCYTES # BLD AUTO: 0.88 K/MM3 (ref 0.84–5.2)
LYMPHOCYTES NFR BLD AUTO: 17 % (ref 21–46)
MCHC RBC AUTO-ENTMCNC: 32.8 G/DL (ref 31.5–36.5)
MCV RBC AUTO: 91 FL (ref 80–100)
MONOCYTES # BLD AUTO: 0.56 K/MM3 (ref 0.16–1.47)
MONOCYTES NFR BLD AUTO: 11 % (ref 4–13)
NEUTROPHILS # BLD AUTO: 3.49 K/MM3 (ref 1.96–9.15)
NEUTROPHILS NFR BLD AUTO: 69 % (ref 41–73)
NRBC # BLD AUTO: 0 K/MM3 (ref 0–0.02)
NRBC BLD AUTO-RTO: 0 /100 WBC (ref 0–0.2)
PLATELET # BLD AUTO: 217 K/MM3 (ref 150–400)
POTASSIUM SERPL-SCNC: 4.4 MMOL/L (ref 3.5–5.5)
PROT SERPL-MCNC: 6.7 G/DL (ref 6.4–8.2)
PROT UR STRIP-MCNC: (no result) MG/DL
RBC #/AREA URNS HPF: (no result) /HPF (ref 0–2)
RSV RNA SPEC QL NAA+PROBE: NEGATIVE
SALICYLATES SERPL-MCNC: <1.7 MG/DL (ref 2.8–20)
SARS-COV-2 RNA RESP QL NAA+PROBE: NEGATIVE
SODIUM SERPL-SCNC: 141 MMOL/L (ref 136–145)
SP GR SPEC: 1.01 (ref 1–1.02)
UROBILINOGEN UR STRIP-MCNC: (no result) MG/DL

## 2022-03-31 PROCEDURE — G0378 HOSPITAL OBSERVATION PER HR: HCPCS

## 2022-03-31 PROCEDURE — G0480 DRUG TEST DEF 1-7 CLASSES: HCPCS

## 2022-03-31 PROCEDURE — A9270 NON-COVERED ITEM OR SERVICE: HCPCS

## 2022-04-03 NOTE — NUR
END OF SHIFT SUMMARY:
PATIENT MEDICATED FOR PAIN ONE TIME WITH TYLENOL. PATIENT REPORTS CHRONIC BACK
PAIN. PATIENT UP TO THE CHAIR FOR MEALS. PATIENT HAS SOME UNSTEADINESS WITH
GAIT THAT IS IMPROVED WITH USE OF THE WALKER. PATIENT USES WALKER IN THE ROOM
WITH ASSISTANCE. NO DIFFICULTIES SWALLOWING NOTED. PATIENT UP TO THE BATHROOM
WITH STAFF TO VOID.
PATIENT CALM AND COOPERATIVE IN GENERAL. PATIENT HAD FLEETING IRRITATION WITH
THE BED ALARM. HOWEVER, AS THE DAY PROGRESSED, PATIENT'S LEVEL OF AGITATION
INCREASED. PATIENT UP OUT OF BED FREQUENTLY. INCREASINGLY IRRITATED WITH THE
BED ALARM. PATIENT MEDICATED PER PRNS. PATIENT CALM FOR A WHILE, BUT AT CHANGE
OF SHIFT, PATIENT STARTED TO ESCALATE AGAIN, STATING THAT HIS "RIDE WOULD BE
HERE ANYTIME".

## 2022-04-03 NOTE — NUR
PATIENT TO NOT CALL WIFE: LATE ENTRY
THIS AFTERNOON, RN RECEIVED A PHONE CALL FROM THE PATIENT'S WIFE (ROD AT
[phone]). REQUESTING THAT THE PATIENT NOT CALL HER. SHE REPORTS THAT IT
IS TOO DISTRESSING FOR HER.

## 2022-04-03 NOTE — NUR
Patient is alert and oriented to self only. When asked if he had any plan to
hurt himself or others, patient denied, states "not right now" Patient is
calm, but easily angered. He insist on walking by himself. He has an unsteady
gait with hx of frequent falls. Skin is intact. Patient able to swallow pills
and drink water. Offered snacks. No complains of pain. Patient is continent in
urine, limited urine output hx of bph. Spoke with wife tonight and she was
very concerned for him and states she is not able to care for him. She did not
want to talk to him on the phone just in case he will get agitated since she
is not here. Patient is awake but resting on his bed. Bed alarm on. Call light
within reach. RN outside of door, hourly rounding ongoing until end of shift.

## 2022-04-04 LAB
ALBUMIN SERPL BCP-MCNC: 3.6 G/DL (ref 3.4–5)
ALBUMIN/GLOB SERPL: 1.3 {RATIO} (ref 0.8–1.8)
ALT SERPL W P-5'-P-CCNC: 34 U/L (ref 12–78)
ANION GAP SERPL CALCULATED.4IONS-SCNC: 2 MMOL/L (ref 6–16)
AST SERPL W P-5'-P-CCNC: 22 U/L (ref 12–37)
BASOPHILS # BLD AUTO: 0.03 K/MM3 (ref 0–0.23)
BASOPHILS NFR BLD AUTO: 1 % (ref 0–2)
BILIRUB SERPL-MCNC: 0.5 MG/DL (ref 0.1–1)
BUN SERPL-MCNC: 18 MG/DL (ref 8–24)
CALCIUM SERPL-MCNC: 10.4 MG/DL (ref 8.5–10.1)
CHLORIDE SERPL-SCNC: 112 MMOL/L (ref 98–108)
CO2 SERPL-SCNC: 30 MMOL/L (ref 21–32)
CREAT SERPL-MCNC: 1.18 MG/DL (ref 0.6–1.2)
DEPRECATED RDW RBC AUTO: 46.4 FL (ref 35.1–46.3)
EOSINOPHIL # BLD AUTO: 0.08 K/MM3 (ref 0–0.68)
EOSINOPHIL NFR BLD AUTO: 1 % (ref 0–6)
ERYTHROCYTE [DISTWIDTH] IN BLOOD BY AUTOMATED COUNT: 13.8 % (ref 11.7–14.2)
GLOBULIN SER CALC-MCNC: 2.8 G/DL (ref 2.2–4)
GLUCOSE SERPL-MCNC: 103 MG/DL (ref 70–99)
HCT VFR BLD AUTO: 38.9 % (ref 37–53)
HGB BLD-MCNC: 12.5 G/DL (ref 13.5–17.5)
IMM GRANULOCYTES # BLD AUTO: 0.02 K/MM3 (ref 0–0.1)
IMM GRANULOCYTES NFR BLD AUTO: 0 % (ref 0–1)
LYMPHOCYTES # BLD AUTO: 1.01 K/MM3 (ref 0.84–5.2)
LYMPHOCYTES NFR BLD AUTO: 18 % (ref 21–46)
MCHC RBC AUTO-ENTMCNC: 32.1 G/DL (ref 31.5–36.5)
MCV RBC AUTO: 92 FL (ref 80–100)
MONOCYTES # BLD AUTO: 0.81 K/MM3 (ref 0.16–1.47)
MONOCYTES NFR BLD AUTO: 14 % (ref 4–13)
NEUTROPHILS # BLD AUTO: 3.69 K/MM3 (ref 1.96–9.15)
NEUTROPHILS NFR BLD AUTO: 65 % (ref 41–73)
NRBC # BLD AUTO: 0 K/MM3 (ref 0–0.02)
NRBC BLD AUTO-RTO: 0 /100 WBC (ref 0–0.2)
PLATELET # BLD AUTO: 174 K/MM3 (ref 150–400)
POTASSIUM SERPL-SCNC: 3.8 MMOL/L (ref 3.5–5.5)
PROT SERPL-MCNC: 6.4 G/DL (ref 6.4–8.2)
SODIUM SERPL-SCNC: 144 MMOL/L (ref 136–145)

## 2022-04-04 NOTE — NUR
Patient is alert and oriented to self only. Patient became very agitated and
insisting on walking around by himself. He is confuse and wants to get out. He
is a fall risk, several times he almost fell. Staff was next to him the whole
time redirecting him. He is angry about being inside. When staff redirected
him, he got agitated and made a fist and threaten to hit. He calls staff
inappropriate names. RN administered PRN zyprexa, patient was agreeable.
Patient became more sleepy but agitated and insisting walking around. He
refuses to go back to bed. Security was called because patient became
combative and yelling at staff. Nursing staff and security placed him on his
bed, bilateral upper extremity restraints on and posey. He is upset and
threatening staff.
Patient keeps tugging to get restraints off. Q2 restraint assessment
completed. Urinal provided, water given, turns and circulation completed.
Patient is still confuse looking for his keys and wife. He believes he is
still at home. Call light within reach.

## 2022-04-04 NOTE — NUR
PATIENT REST:
DR. RANGEL CONTACTED RN AND DISCUSSED GOAL FOR THE PATIENT TO GET REST. AT
THE TIME OF THE PHONE CALL, PATIENT CONTINUED TO YELL AT STAFF, TREATEN STAFF
WITH VIOLENCE, AND USE OBSCENITIES DIRECTED AT STAFF. EVEN WITH STAFF OUT OF
THE ROOM, PATIENT UNABLE TO REST. MEDICATED PATIENT WITH PRNS PER DR. RANGEL ORDERS. PATIENT NOW RESTING QUIETLY IN THE ROOM. PATIENT CHUCKLES
OCCASIONALLY. PATIENT OPENS EYES WHEN STAFF DIRECTS A COMMENT AT HIM. ALLOWED
STAFF TO PERFORM GENTLE ROM WITHOUT DISRUPTION.

## 2022-04-04 NOTE — NUR
PT very drowsy from Rx'd meds recieved on day shift. He opens eyes to voice
and appears in no distress but he' isn't verbally answering Q's at this time.
RN unable to accurately assess SI risk at present.

## 2022-04-04 NOTE — NUR
END OF SHIFT SUMMARY:
AT THE BEGINNING OF THE SHIFT, THE PATIENT WAS IN SOFT RESTRAINTS OF UPPER
EXTREMITIES. WHEN PROVIDING CARE, THE PATIENT WOULD SLIDE DOWN IN BED TO KICK
THE STAFF MEMBER IN THE HEAD AND CHEST. PATIENT YELLING AT STAFF, THREATENING
VIOLENCE, AND USING OBSCENITIES. DISCUSSED PLAN OF CARE WITH DR. STRICKLAND AND
DR. RANGEL. PATIENT RESTRAINED UNDER NEW ORDERS TO KEEP PATIENT AND STAFF
SAFE. PATIENT MEDICATED WITH NEW PRNS TO ALLOW THE PATIENT SOME SLEEP AND REST
(IT HAD BEEN OVER 24 HOURS SINCE THE PATIENT HAD LAST SLEPT). PATIENT
SLEPT/RESTED FOR THE REST OF THE DAY. WHEN CARE PROVIDED, PATIENT CONTINUED TO
YELL OBSCENITIES AT STAFF, PULL ON THE RESTRAINTS AND THREATEN VIOLENCE.
DIRECT INSTRUCTIONS AND EXPLANATIONS OF CARE HELPED TO CALM THE PATIENT.
PATIENT WOULD EVEN ASSIST WITH TURNING FROM SIDE TO SIDE AND WITH BOOSTING.
BY THE END OF SHIFT, PATIENT CALLING OUT TO NOTIFY STAFF THAT HE NEEDED TO
VOID.
PATIENT EXPERIENCED ELEVATED BPS TODAY. DISCUSSED WITH DR. STRICKLAND. NEW ORDERS
RECEIVED AND PATIENT MEDICATED WITH NEW ORDERS. SOME IMPROVEMENT NOTED BY THE
END OF SHIFT.

## 2022-04-05 NOTE — NUR
TRIALED PT TO MIRZA OFF WRIST RESTRAINTS. DOING BETTER. KEEPING VEST FOR FALL
SAFETY, NOT WELL REDIRECTABLE.

## 2022-04-05 NOTE — NUR
pT PLEASANT THIS MORNING. ALERT AND ORIENTED X3, FORGETFUL . TREMULOUS.
SHAKEY. DENIES PAIN. DENIES S/I. ASKES WHY WOULD HE?, H/R REG, NO MURMER
NOTED. NO TELE. LUNGS CLEAR, RESP EASY, UNLABORED. ON R.A. BT X4, LAST BM
NOTKNOWN BY PT. VOIDS PER URINAL AND SOME INCONT. CDI AT THIS TIME. IN VEST
FOR FALL RISK AND NOT BEING DIRECTABLE. DID KICK AT STAFF YEST. AS SUCH IS IN
SOFT RESTRAINTS AND VEST AT THIS TIME. BED IN LOW OSITION, CALL LITE IN REACH.
BED ALARM ON FOR SAFETY

## 2022-04-05 NOTE — NUR
PT PLEASANT COOP TODAY. NO C/O PAIN. HAS BEEN CONFUSED, ORIENTED TO SELF.
REMOVED THE RESTRAINTS DOWN TO THE VEST FOR SAFETY. DR ORDERED BOWEL CARE.
STARTED. NO NEW CONCERNS NOTED. DENIES ANY S/I. HE IS NOT EASILY REDIRECTABLE.
UPDATED CONDITION WITH WIFE TODAY. DR UPDATED B/P MEDS TODAY. BED IN LOW
POSITION, CALL LITE IN REACH, BED ALARM ON FOR SAFETY

## 2022-04-05 NOTE — NUR
SUMMARY: PT ORIENTED TO SELF ONLY. HE SOMETIMES CALLS OUT WIFE'S NAME BUT IS
OTHERWISE CONFUSED. HE'S UNABLE TO USE CALL LIGHT AND INSTEAD YELLS INTO HALLS
FOR ASSIST. PT ABLE TO SPECIFY SOME NEEDS (IE "I NEED TO PEE!) BUT SPEECH IS
NONSENSICAL OTHER THAN ANSWERING POINTED Q'S. HE'S BEEN MOSTLY COOPERATIVE
W/CARE THIS SHIFT, EVEN ASSISTING W/TURNING, BUT EXPLANATION, PARAPHRASING AND
REITTERATION IS REQUIRED FOR COMPREHENSION. PT DOES ESCALATE QUICKLY THOUGH,
BECOMING EASILY FRUSTRATED AND AGGRESSIVE, CURSING AT STAFF AND MAKING
PHYSICAL THREATS WHEN STAFF INITIALLY ATTEMPT CARE/ADL'S. DESPITE LABILE MOOD,
TOUGH CUFFS NO LONGER FELT APPROPRIATE AND BILAT SOFT WRIST RESTRAINTS W/
[place] VEST WERE REPLACED FOR IMPULSIVITY, UNPREDICTABLE BEHAVIOR, FALL RISK
AND SAFETY. CAMERA MONITORING AND BED ALARM IN PLACE. TURN SCHEDULE MAINTAINED
AND ATTENDS CHANGED FOR URGE INCONTINENCE. URINAL ASSIST AND MOUTH CARE
PROVIDED PRN. HE TOOK MEDS WHOLE W/WATER AND REQUIRED PRN SEROQUEL T/O NOCTE
FOR CALMING EFFECT/PROMOTION OF SLEEP/REST. PRN B53 WASN'T NEEDED THIS SHIFT.
BP REMAINS ELEVATED W/VASOTEC PROVIDED FOR /123, PLAN TO MONITOR FOR
EFFECT. NO ACUTE CHANGES. GUARDIANSHIP AND PLACEMENT PENDING. WCTM AND REPORT
TO DAY RN.

## 2022-04-06 NOTE — NUR
SHIFT SUMMARY-
PT HAS BEEN PLEASENT AND COOPERATIVE WITH STAFF T/O THE SHIFT. HE WAS ASSISTED
TO THE RECLINER 2PA THIS MORNING AND HAS DECLINED TO GET BACK INTO BED.
RESTRAINTS DC'D AT 0900. PT HAS NAPPED ALOT T/O THE SHIFT WAKING FOR MEALS. HE
IS HAVING DIFFICULTY EATING D/T HIS MISSING BOTTOM DENTURES. PT HAS SOME
NATICABLE TREMMORS WHEN HE IS TRYING TO EATDRINKS WITH A LID AND STRAW HELP TO
GREATLY REDUCE THE MESS. PT STILL WANTS TO BE INDEPENDENT, HE TRIES TO PERFORM
A TASK AND IF HE CAN NOT DO IT HE WILL ALLOW STAFF TO ASSIST. PT BECOMES
FRUSTRATED EASILY AND WILL HEAVILY PUT OBJECTS DOWN AT TIMES, BUT IT DOES NOT
SEEM THAT HE IS SHOWING AGRESSION TOWARDS ANYONE, JUST THE SITUATION. WILL CTM
AND PASS ALL ON TO NIGHT RN IN REPORT.

## 2022-04-06 NOTE — NUR
SUMMARY: PT REMAINS CONFUSED BUT MENTATION SEEMS SOMEWHAT IMPROVED FROM
PREVIOUS NOCTE. HE'S LESS IMPULSIVE, RESTED MORE EASILY AND WAS MUCH MORE
SETTLED THIS SHIFT. WHILE GETTING AGITATED AT TIMES, HE WAS MOSTLY PLEASANT
AND COOPERATIVE W/CARE. PT SEEMS MOST IRRITATED WHEN ATTENDS NEED CHANGED AND
HE WANTS TO PERFORM CARE HIMSELF BUT IS UNABLE. HE REDIRECTED W/EXPLANATION
AND WAS ABLE TO FOLLOW COMMANDS. TURN SHEDULE MAINTAINED, URINAL ASSIST
PROVIDED AND ATTENDS/LINEN CHANGED PRN. HE REMAINS IN POSEY VEST WITH BED
ALARM ON AND CAMERA MONITORING IN PROGRESS FOR FALL RISK, CONFUSION AND
SAFETY. BP IS MUCH BETTER W/NEW MED REGIMEN. VSS/AFEBRILE AND NO ACUTE
CHANGES. WCTM AND REPORT TO DAY RN.

## 2022-04-06 NOTE — NUR
PT WAS RECENTLY ABLE TO T/F W/ASSIST TO BSC AND RETURN TO BED W/O EVENT. MAY
TRIAL PT OUT OF POSEY VEST IF HE CONT'S TO BE DIRECTABLE AND COMPLIANT
W/INSTRUCTIONS. WILL DISCUSS W/DAY RN.

## 2022-04-06 NOTE — NUR
PT BOTTOM DENTURES CAN NOT BE FOUND-
PT HAD TOP DENTURES IN PLACE AT THE START OF THE SHIFT TODAY, BOTTOM DENTURES
WERE NOT IN PLACE. WHEN THE PT WAS ASSISTED TO A CHAIR FOR BREAKFAST HE
REQUESTED THE OTHER HALF OF HIS DENTURES (THE BOTTOM HALF). STAFF WERE UNABLE
TO LOCATE THEM. TODD BRADY STATES SHE WAS ASSIGNED THIS PT TWO DAYS AGO AND
HE HAD A FULL SET OF DENTURES, THE BOTTOM HALF WAS LOOSE AND THE PT WAS
SLEEPING, THAT HALF WAS REMOVED THAT DAY AND PLACED IN A DENTURE CUP FOR FEAR
OF CHOKING. CAITLYN CALLED ULISES ROBERTSON WHO WAS ASSIGNED THE PT YESTERDAY, HE GAVE
THE PT HIS BOTTOM DENTURES YESTERDAY SO HE COULD EAT. UNCERTAIN WHERE THEY
WENT BETWEEN YESTERDAY AND THIS MORNING, BED WAS THOROUGHLY CHECKED AND TODD BRADY DID NOT SEE THEM ON ANY TRAYS SHE PICKED UP YESTERDAY. CONTACTED THE
PT ADVOCATE, SHE IS AWARE AND WILL CONTACT DIET[facility] AND [facility] TO SEE IF THEY
CAN BE LOCATED.

## 2022-04-06 NOTE — NUR
CALLED DR STRICKLAND-
PT HAS A NEW ORDER FOR ZYPREXA PRN FOR AGITATION IM, REQUESTED SOMETHING ORAL.
RECIEVED A PRN ORDER FOR 5MG PO ZYPREXA Q4 AS NEEDED FOR AGGITATION.

## 2022-04-06 NOTE — NUR
SHIFT SUMMARY-
PT CONTINUES TO BE PLEASENTLY CONFUSED, EASILY REDIRECTABLE, WITH NO S&S OF
AGRESSION, PAIN OR DISTRESS NOTED T/O THE DAY. PT BP WAS LOW ON MORNING VITALS
BUT REMAINED ASYMPTOMATIC, A MANUAL RECHECK WAS DONE AND WAS FOUND TO BE LOW
NORMAL LIMIT. PT IS ORIENTED TO SELF ONLY. HE SPENT AM MAJORITY OF THE DAY
WANDERING THE HALLS AND SITTING IN A CHAIR NEXT TO THIS RN. PT CAN WANDER
INAPPROPRIATELY AT TIMES, HE EASILY REDIRECTS, BUT NEEDS TO BE CLOSELY
MONITORED. WILL PASS ALL ON TO NIGHT RN AT CHANGE OF SHIFT, WILL CTM.

## 2022-04-07 NOTE — NUR
NO ACUTE CHANGES. PT AOX3 WITH MILD CONFUSION. PT RESTING IN BED MOST OF THE
DAY. PT DID HAVE A SHOWER AND HAS SAT IN CHAIR FOR MEALS. PT DENIES PAIN AND
IS COOPERATIVE OF CARE. BED ALARM IN PLACE, PT IS A ONE PERSON WITH GAITBELT
AND WALKER CAN BE A BIT UNSTABLE ON HIS FEET AND NEEDS THE EXTRA SUPPORT. CALL
LIGHT IS WITHIN REACH WILL CONTINUE TO MONITOR.

## 2022-04-07 NOTE — NUR
PT IS A/OX2. HE DOES HAVE SOME IRRITATION AND AGGRESSIVENESS AT TIMES,
ALTHOUGH REDIRECTABLE. HE IS A 2 PA W/ GAIT TO [facility]; PT WILL USE URINAL WITH
ASSISTANCE. PT IS GUARDIANSHIP AND PLACEMENT AT THIS TIME. THE BED IS IN THE
LOWEST POSITION, ALARM IS SET AND CALL LIGHT IS WITHIN REACH.

## 2022-04-08 NOTE — NUR
NO ACUTE CHANGES PT HAS BEEN AOX3 WITH CONFUSION. PT HAS BEEN DOING GOOD AS A
ONE PERSON ASSIST TO RESTROOM WITH WALKER. PT DOES NOT USE CALL LIGHT AND SETS
BED ALARM OFF. PT GET IRRITABLE WITH CAREGIVERS HELPING HIM. PT HAS CALL LIGHT
WITHIN REACH AND BED ALARM IN PLACE. WILL CONTINUE TO MONITOR.

## 2022-04-08 NOTE — NUR
NIGHT SHIFT SUMMARY
 
ADMITTED FOR SI. PT IS FULL CODE. PT IS NO LONGER ON SUICIDE PRECAUTIONS. HE
IS ALERT AND ORIENTED TO SELF AND SURROUNDINGS, WAKING UP CONFUSED AT TIMES.
PT IS IMPULSIVE - BED ALARM ON. SOMETIMES UNSTEADY GAIT. COOPERATIVE WITH CARE
THIS SHIFT. PT REPORTS SOME CHRONIC BACK PAIN BUT DENIES PAIN MEDICATION. NO
OTHER CONCERNS THIS SHIFT. PT AWAITING PLACEMENT.

## 2022-04-09 LAB
ALBUMIN SERPL BCP-MCNC: 3.4 G/DL (ref 3.4–5)
ALBUMIN/GLOB SERPL: 1.1 {RATIO} (ref 0.8–1.8)
ALT SERPL W P-5'-P-CCNC: 31 U/L (ref 12–78)
ANION GAP SERPL CALCULATED.4IONS-SCNC: 7 MMOL/L (ref 6–16)
AST SERPL W P-5'-P-CCNC: 23 U/L (ref 12–37)
BASOPHILS # BLD AUTO: 0.03 K/MM3 (ref 0–0.23)
BASOPHILS NFR BLD AUTO: 0 % (ref 0–2)
BILIRUB SERPL-MCNC: 0.5 MG/DL (ref 0.1–1)
BUN SERPL-MCNC: 38 MG/DL (ref 8–24)
CALCIUM SERPL-MCNC: 10.7 MG/DL (ref 8.5–10.1)
CHLORIDE SERPL-SCNC: 111 MMOL/L (ref 98–108)
CO2 SERPL-SCNC: 26 MMOL/L (ref 21–32)
CREAT SERPL-MCNC: 1.54 MG/DL (ref 0.6–1.2)
DEPRECATED RDW RBC AUTO: 44.5 FL (ref 35.1–46.3)
EOSINOPHIL # BLD AUTO: 0.04 K/MM3 (ref 0–0.68)
EOSINOPHIL NFR BLD AUTO: 1 % (ref 0–6)
ERYTHROCYTE [DISTWIDTH] IN BLOOD BY AUTOMATED COUNT: 13.2 % (ref 11.7–14.2)
GLOBULIN SER CALC-MCNC: 3.1 G/DL (ref 2.2–4)
GLUCOSE SERPL-MCNC: 98 MG/DL (ref 70–99)
HCT VFR BLD AUTO: 36.8 % (ref 37–53)
HGB BLD-MCNC: 12.1 G/DL (ref 13.5–17.5)
IMM GRANULOCYTES # BLD AUTO: 0.03 K/MM3 (ref 0–0.1)
IMM GRANULOCYTES NFR BLD AUTO: 0 % (ref 0–1)
LYMPHOCYTES # BLD AUTO: 1.15 K/MM3 (ref 0.84–5.2)
LYMPHOCYTES NFR BLD AUTO: 17 % (ref 21–46)
MCHC RBC AUTO-ENTMCNC: 32.9 G/DL (ref 31.5–36.5)
MCV RBC AUTO: 91 FL (ref 80–100)
MONOCYTES # BLD AUTO: 0.95 K/MM3 (ref 0.16–1.47)
MONOCYTES NFR BLD AUTO: 14 % (ref 4–13)
NEUTROPHILS # BLD AUTO: 4.51 K/MM3 (ref 1.96–9.15)
NEUTROPHILS NFR BLD AUTO: 67 % (ref 41–73)
NRBC # BLD AUTO: 0 K/MM3 (ref 0–0.02)
NRBC BLD AUTO-RTO: 0 /100 WBC (ref 0–0.2)
PLATELET # BLD AUTO: 203 K/MM3 (ref 150–400)
POTASSIUM SERPL-SCNC: 4.3 MMOL/L (ref 3.5–5.5)
PROT SERPL-MCNC: 6.5 G/DL (ref 6.4–8.2)
SODIUM SERPL-SCNC: 144 MMOL/L (ref 136–145)
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
## 2022-04-09 NOTE — NUR
SUMMARY
 
PT SITTING UP IN BED EATING DINNER, PT HAS BEEN CONFUSED T/O THE DAY, AGITATED
AND THREATENING TOWARDS STAFF AT TIMES, PT REFUSED MEDS AT TIMES, OFFERED THEM
SEVERAL TIMES UNTIL PT TOOK THEM, PT WAS UP IN THE CHAIR FOR A SHORT TIME, IT
DID NOT HELP WITH HIS AGITATION, WALKED THE PT TO THE BATHROOM WITH 2 P AND A
WALKER, PT HAD A BM, IT DID NOT HELP HIS AGITATION, PT MED PER EMAR WITH
TYLENOL FOR SHOULDER PAIN, PT MED PER EMAR FOR AGITATION, CARE MANAGEMENT
WORKING ON A SAFE DISCHARGE PLAN, VSS, WILL CONT TO MONITOR
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
## 2022-04-10 NOTE — NUR
SUMMARY
 
PT RESTING IN BED QUIETLY FOR THE FIRST TIME TODAY, PT HAS BEEN AGITATED AND
RESTLESS T/O THE DAY, AGGRESIVE TOWARDS STAFF, OFTEN REFUSING CARE AND
THREATENING, PT HAS BEEN MED PER EMAR FOR AGITATION, PT WITH HTN, MED PER EMAR
FOR HTN, FAMILY WORKING WITH CARE MANAGEMENT FOR A SAFE DISCHARGE PLAN, WILL
CONT TO MONITOR

## 2022-04-11 NOTE — NUR
Pt is increasing in aggitation, did get up and ambulate to the toilet but wont
allow anyone to assist him and became beligerant and uncooperative. olanzapine
given, did take it, but anything offered to help him angers him. wants to sit
in chair at this time until he catches his breath. call light in reach.

## 2022-04-11 NOTE — NUR
pt sitting in chair, found by cna unresponsive and had vomited on himself. b/p
97/54, he woke pretty quickly but seems very groggy, when assisting him to the
bed he drug his left leg. RRT was called, suction set up, Dr. Moeller notified
of events.

## 2022-04-11 NOTE — NUR
pt laying in bed, posey was removed this am, denies pain, but states he's not
good, lungs are clear dim in bases, resp even and unlabored, no cough noted,
hrr, no edema noted, ppp faint, cap refill <3sec, vs stable, afebrile, btx4,
abd flat soft nontender, incont of bowel and bladder, skin c/w/d, maew, sanket
call light in reach.

## 2022-04-11 NOTE — NUR
pt has had more aggitation this late afternoon and evening. he did recieve
zyprexa and settled down, is impulsive, after he got up from his chair we got
him to bed, and is settling down now. bed alarm activated, call light in
reach, but he doesn't tend to use it. no further changes this shift.

## 2022-04-11 NOTE — NUR
PT HAS SLEPT ALL NIGHT W/ NO ATTEMPTS TO GET OUT OF BED OR OUT OF THE
RESTRAINTS. AT BED CHANGE, A NEW POSEY WAS PUT ON THE PT BUT IT WAS NOT TIED
TO SEE HOW WELL THE PT CAN TOLERATE BEING OUT OF RESTRAINTS BEFORE THE ORDER
IS ACTUALLY DC'D.

## 2022-04-12 LAB
ALBUMIN SERPL BCP-MCNC: 3 G/DL (ref 3.4–5)
ANION GAP SERPL CALCULATED.4IONS-SCNC: 5 MMOL/L (ref 6–16)
BASOPHILS # BLD AUTO: 0.03 K/MM3 (ref 0–0.23)
BASOPHILS NFR BLD AUTO: 0 % (ref 0–2)
BUN SERPL-MCNC: 49 MG/DL (ref 8–24)
CALCIUM SERPL-MCNC: 10.3 MG/DL (ref 8.5–10.1)
CHLORIDE SERPL-SCNC: 106 MMOL/L (ref 98–108)
CO2 SERPL-SCNC: 28 MMOL/L (ref 21–32)
CREAT SERPL-MCNC: 2.16 MG/DL (ref 0.6–1.2)
DEPRECATED RDW RBC AUTO: 46.7 FL (ref 35.1–46.3)
EOSINOPHIL # BLD AUTO: 0.02 K/MM3 (ref 0–0.68)
EOSINOPHIL NFR BLD AUTO: 0 % (ref 0–6)
ERYTHROCYTE [DISTWIDTH] IN BLOOD BY AUTOMATED COUNT: 13.8 % (ref 11.7–14.2)
GLUCOSE SERPL-MCNC: 102 MG/DL (ref 70–99)
HCT VFR BLD AUTO: 37.6 % (ref 37–53)
HGB BLD-MCNC: 12 G/DL (ref 13.5–17.5)
IMM GRANULOCYTES # BLD AUTO: 0.03 K/MM3 (ref 0–0.1)
IMM GRANULOCYTES NFR BLD AUTO: 0 % (ref 0–1)
LEUKOCYTE ESTERASE UR QL STRIP: (no result)
LYMPHOCYTES # BLD AUTO: 1.26 K/MM3 (ref 0.84–5.2)
LYMPHOCYTES NFR BLD AUTO: 13 % (ref 21–46)
MAGNESIUM SERPL-MCNC: 2.3 MG/DL (ref 1.6–2.4)
MCHC RBC AUTO-ENTMCNC: 31.9 G/DL (ref 31.5–36.5)
MCV RBC AUTO: 92 FL (ref 80–100)
MONOCYTES # BLD AUTO: 0.76 K/MM3 (ref 0.16–1.47)
MONOCYTES NFR BLD AUTO: 8 % (ref 4–13)
NEUTROPHILS # BLD AUTO: 7.43 K/MM3 (ref 1.96–9.15)
NEUTROPHILS NFR BLD AUTO: 78 % (ref 41–73)
NRBC # BLD AUTO: 0 K/MM3 (ref 0–0.02)
NRBC BLD AUTO-RTO: 0 /100 WBC (ref 0–0.2)
PHOSPHATE SERPL-MCNC: 3.4 MG/DL (ref 2.5–4.9)
PLATELET # BLD AUTO: 208 K/MM3 (ref 150–400)
POTASSIUM SERPL-SCNC: 4.3 MMOL/L (ref 3.5–5.5)
PROT UR STRIP-MCNC: (no result) MG/DL
RBC #/AREA URNS HPF: (no result) /HPF (ref 0–2)
SODIUM SERPL-SCNC: 139 MMOL/L (ref 136–145)
SP GR SPEC: 1.02 (ref 1–1.02)
UROBILINOGEN UR STRIP-MCNC: (no result) MG/DL
WBC #/AREA URNS HPF: (no result) /HPF (ref 0–5)

## 2022-04-12 NOTE — NUR
pt very sleepy this am, Dr. Moeller in to see him ordered fluid,  new iv was
placed to lfa, pt was able to swallow his pills but went right back to sleep,
a/o to self, lungs are clear dim in bases, on r/a, resp even and unlabored, no
cough noted, but did cough a bit after drinking water, but cleared, hrr, no
edema noted, ppp+1, cap refill <3sec, vs stable, afebrile, btx4, abd flat
soft nontender, incont of urine but gets to br for stool, skin c/w/d, maew,
has tremors when awake, sanket, call light in reach.

## 2022-04-12 NOTE — NUR
SHIFT SUMMARY
AOX1-SELF & ABLE TO STATE HE WAS IN HOSPITAL LAST NIGHT. UNABLE TO ANSWER ANY
OTHER ORIENTATION QUESTIONS & HAS DIFFICULTY FOLLOWING DIRECTIONS @TIMES.
CONFUSED, IMPULSIVE. CAN BE AGITATED, IRRITATED WHEN STAFF ATTEMPTING TO
ASSIST c CARE, NEEDS FREQUENT QUES/REMINDERS STAFF IS TRYING TO HELP. NOT
COMBATIVE THIS SHIFT. SLEPT SOUNDLY T/O NIGHT. VSS. AWAITING SAFE PLACEMENT.
CALL LIGHT & BED ALARM IN PLACE. WILL MONITOR.
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
## 2022-04-12 NOTE — NUR
pt has slept most of the day, did not eat lunch, v.s stable but still on the
low side. he did wake for dinner, and is cooperative, spoke with his wife, she
is trying to get him in [place], would like to discuss with  no further
changes this shift. call light in reach.

## 2022-04-13 LAB
ALBUMIN SERPL BCP-MCNC: 2.7 G/DL (ref 3.4–5)
ALBUMIN SERPL BCP-MCNC: 2.8 G/DL (ref 3.4–5)
ANION GAP SERPL CALCULATED.4IONS-SCNC: 2 MMOL/L (ref 6–16)
ANION GAP SERPL CALCULATED.4IONS-SCNC: 5 MMOL/L (ref 6–16)
BASOPHILS # BLD AUTO: 0.02 K/MM3 (ref 0–0.23)
BASOPHILS NFR BLD AUTO: 0 % (ref 0–2)
BUN SERPL-MCNC: 48 MG/DL (ref 8–24)
BUN SERPL-MCNC: 54 MG/DL (ref 8–24)
CALCIUM SERPL-MCNC: 10.1 MG/DL (ref 8.5–10.1)
CALCIUM SERPL-MCNC: 10.2 MG/DL (ref 8.5–10.1)
CHLORIDE SERPL-SCNC: 109 MMOL/L (ref 98–108)
CHLORIDE SERPL-SCNC: 111 MMOL/L (ref 98–108)
CO2 SERPL-SCNC: 26 MMOL/L (ref 21–32)
CO2 SERPL-SCNC: 27 MMOL/L (ref 21–32)
CREAT SERPL-MCNC: 1.68 MG/DL (ref 0.6–1.2)
CREAT SERPL-MCNC: 2.13 MG/DL (ref 0.6–1.2)
DEPRECATED RDW RBC AUTO: 46.7 FL (ref 35.1–46.3)
EOSINOPHIL # BLD AUTO: 0.12 K/MM3 (ref 0–0.68)
EOSINOPHIL NFR BLD AUTO: 2 % (ref 0–6)
ERYTHROCYTE [DISTWIDTH] IN BLOOD BY AUTOMATED COUNT: 13.6 % (ref 11.7–14.2)
GLUCOSE SERPL-MCNC: 101 MG/DL (ref 70–99)
GLUCOSE SERPL-MCNC: 137 MG/DL (ref 70–99)
HCT VFR BLD AUTO: 37.2 % (ref 37–53)
HGB BLD-MCNC: 11.8 G/DL (ref 13.5–17.5)
IMM GRANULOCYTES # BLD AUTO: 0.03 K/MM3 (ref 0–0.1)
IMM GRANULOCYTES NFR BLD AUTO: 1 % (ref 0–1)
LYMPHOCYTES # BLD AUTO: 1.05 K/MM3 (ref 0.84–5.2)
LYMPHOCYTES NFR BLD AUTO: 21 % (ref 21–46)
MCHC RBC AUTO-ENTMCNC: 31.7 G/DL (ref 31.5–36.5)
MCV RBC AUTO: 93 FL (ref 80–100)
MONOCYTES # BLD AUTO: 0.67 K/MM3 (ref 0.16–1.47)
MONOCYTES NFR BLD AUTO: 13 % (ref 4–13)
NEUTROPHILS # BLD AUTO: 3.24 K/MM3 (ref 1.96–9.15)
NEUTROPHILS NFR BLD AUTO: 63 % (ref 41–73)
NRBC # BLD AUTO: 0 K/MM3 (ref 0–0.02)
NRBC BLD AUTO-RTO: 0 /100 WBC (ref 0–0.2)
PHOSPHATE SERPL-MCNC: 3.1 MG/DL (ref 2.5–4.9)
PHOSPHATE SERPL-MCNC: 3.4 MG/DL (ref 2.5–4.9)
PLATELET # BLD AUTO: 186 K/MM3 (ref 150–400)
POTASSIUM SERPL-SCNC: 4.3 MMOL/L (ref 3.5–5.5)
POTASSIUM SERPL-SCNC: 4.3 MMOL/L (ref 3.5–5.5)
SODIUM SERPL-SCNC: 140 MMOL/L (ref 136–145)
SODIUM SERPL-SCNC: 140 MMOL/L (ref 136–145)
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
## 2022-04-13 NOTE — NUR
SHIFT SUMMARY
 
PT HAS BEEN RESTING IN BED MOST OF THE DAY. DENIES PAIN. STARTED BECOMING
AGITATED THIS EVENING AND DISCONNETED IV, CLEANED HIM AND HIS ROOM OF BLOOD
FROM IV, MANAGED TO SAVE THE LINE. HE HAS BEEN MOVING BACK AND FORTH FROM BED
TO CHAIR AND YELLING WHEN STAFF TRY TO HELP HIM SO HE WAS MEDICATED PER MAR.
WILL CONTINUE TO MONITOR.

## 2022-04-14 LAB
ALBUMIN SERPL BCP-MCNC: 3.2 G/DL (ref 3.4–5)
ANION GAP SERPL CALCULATED.4IONS-SCNC: 8 MMOL/L (ref 6–16)
BASOPHILS # BLD AUTO: 0.02 K/MM3 (ref 0–0.23)
BASOPHILS NFR BLD AUTO: 0 % (ref 0–2)
BUN SERPL-MCNC: 42 MG/DL (ref 8–24)
CALCIUM SERPL-MCNC: 10.6 MG/DL (ref 8.5–10.1)
CHLORIDE SERPL-SCNC: 111 MMOL/L (ref 98–108)
CO2 SERPL-SCNC: 25 MMOL/L (ref 21–32)
CREAT SERPL-MCNC: 1.56 MG/DL (ref 0.6–1.2)
DEPRECATED RDW RBC AUTO: 46.2 FL (ref 35.1–46.3)
EOSINOPHIL # BLD AUTO: 0.1 K/MM3 (ref 0–0.68)
EOSINOPHIL NFR BLD AUTO: 2 % (ref 0–6)
ERYTHROCYTE [DISTWIDTH] IN BLOOD BY AUTOMATED COUNT: 13.6 % (ref 11.7–14.2)
GLUCOSE SERPL-MCNC: 106 MG/DL (ref 70–99)
HCT VFR BLD AUTO: 39.6 % (ref 37–53)
HGB BLD-MCNC: 12.7 G/DL (ref 13.5–17.5)
IMM GRANULOCYTES # BLD AUTO: 0.03 K/MM3 (ref 0–0.1)
IMM GRANULOCYTES NFR BLD AUTO: 1 % (ref 0–1)
LYMPHOCYTES # BLD AUTO: 1.24 K/MM3 (ref 0.84–5.2)
LYMPHOCYTES NFR BLD AUTO: 23 % (ref 21–46)
MAGNESIUM SERPL-MCNC: 2.1 MG/DL (ref 1.6–2.4)
MCHC RBC AUTO-ENTMCNC: 32.1 G/DL (ref 31.5–36.5)
MCV RBC AUTO: 92 FL (ref 80–100)
MONOCYTES # BLD AUTO: 0.62 K/MM3 (ref 0.16–1.47)
MONOCYTES NFR BLD AUTO: 11 % (ref 4–13)
NEUTROPHILS # BLD AUTO: 3.47 K/MM3 (ref 1.96–9.15)
NEUTROPHILS NFR BLD AUTO: 63 % (ref 41–73)
NRBC # BLD AUTO: 0 K/MM3 (ref 0–0.02)
NRBC BLD AUTO-RTO: 0 /100 WBC (ref 0–0.2)
PHOSPHATE SERPL-MCNC: 2.6 MG/DL (ref 2.5–4.9)
PLATELET # BLD AUTO: 206 K/MM3 (ref 150–400)
POTASSIUM SERPL-SCNC: 4.2 MMOL/L (ref 3.5–5.5)
SODIUM SERPL-SCNC: 144 MMOL/L (ref 136–145)
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
## 2022-04-14 NOTE — NUR
SHIFT SUMMARY
PT IS AN 81 Y/O MALE, ADMITTED FOR SI. HE IS A&O X SELF ONLY, DIFFICULT TO
REDIRECT WITH EPISODES OF AGITATION. 1PA TO THE BATHROOM.  NO C/O PAIN, NAUSEA
OR SOB. VITAL SIGNS STABLE. PT SLEPT FOR SHORT PERIODS OF TIME, WITH PERIODS
OF AGITATION NOTED (SEE PREVIOUS NOTE). MEDICATED WITH IV AND PO ZYPREXA
DURING THE NIGHT. NO OTHER ACUTE CHANGES IN PT CONDITION NOTED DURING THE
NIGHT. WILL CONTINUE TO MONITOR AND TREAT PER EMAR UNTIL HAND OFF TO DAY SHIFT
RN.

## 2022-04-15 LAB
ALBUMIN SERPL BCP-MCNC: 3.1 G/DL (ref 3.4–5)
ANION GAP SERPL CALCULATED.4IONS-SCNC: 6 MMOL/L (ref 6–16)
BASOPHILS # BLD AUTO: 0.02 K/MM3 (ref 0–0.23)
BASOPHILS NFR BLD AUTO: 0 % (ref 0–2)
BUN SERPL-MCNC: 25 MG/DL (ref 8–24)
CALCIUM SERPL-MCNC: 10.7 MG/DL (ref 8.5–10.1)
CHLORIDE SERPL-SCNC: 111 MMOL/L (ref 98–108)
CO2 SERPL-SCNC: 28 MMOL/L (ref 21–32)
CREAT SERPL-MCNC: 1.23 MG/DL (ref 0.6–1.2)
DEPRECATED RDW RBC AUTO: 45.8 FL (ref 35.1–46.3)
EOSINOPHIL # BLD AUTO: 0.08 K/MM3 (ref 0–0.68)
EOSINOPHIL NFR BLD AUTO: 1 % (ref 0–6)
ERYTHROCYTE [DISTWIDTH] IN BLOOD BY AUTOMATED COUNT: 13.5 % (ref 11.7–14.2)
GLUCOSE SERPL-MCNC: 100 MG/DL (ref 70–99)
HCT VFR BLD AUTO: 36.8 % (ref 37–53)
HGB BLD-MCNC: 11.9 G/DL (ref 13.5–17.5)
IMM GRANULOCYTES # BLD AUTO: 0.04 K/MM3 (ref 0–0.1)
IMM GRANULOCYTES NFR BLD AUTO: 1 % (ref 0–1)
LYMPHOCYTES # BLD AUTO: 0.91 K/MM3 (ref 0.84–5.2)
LYMPHOCYTES NFR BLD AUTO: 16 % (ref 21–46)
MCHC RBC AUTO-ENTMCNC: 32.3 G/DL (ref 31.5–36.5)
MCV RBC AUTO: 92 FL (ref 80–100)
MONOCYTES # BLD AUTO: 0.68 K/MM3 (ref 0.16–1.47)
MONOCYTES NFR BLD AUTO: 12 % (ref 4–13)
NEUTROPHILS # BLD AUTO: 3.84 K/MM3 (ref 1.96–9.15)
NEUTROPHILS NFR BLD AUTO: 69 % (ref 41–73)
NRBC # BLD AUTO: 0 K/MM3 (ref 0–0.02)
NRBC BLD AUTO-RTO: 0 /100 WBC (ref 0–0.2)
PHOSPHATE SERPL-MCNC: 2.2 MG/DL (ref 2.5–4.9)
PLATELET # BLD AUTO: 217 K/MM3 (ref 150–400)
POTASSIUM SERPL-SCNC: 4.2 MMOL/L (ref 3.5–5.5)
SODIUM SERPL-SCNC: 145 MMOL/L (ref 136–145)

## 2022-04-15 NOTE — NUR
NIGHT SHIFT SUMMARY
 
ALTHOUGH TOLERATED HS MEDS, ANGER AND AGITATION CONTINUES. REMAINS HIGH FALL
RISK AND REFUSES TO REDIRECT. MULTIPLE ATTEMPTS TO THRASH ABOUT. RESTRAINTS
CONTINUE - 4 POINT SOFT AND POSEY VEST, FOR SAFETY FOR SELF AND OTHERS. BP
ELEVATED AS WELL, ANTIHYPERTENSIVE ADMINISTERED, SEE MAR FOR DETAILS. WILL
CONTINUE TO ENCOURAGE PT TO COMPLY WITH SAFETY EXPECTATIONS. CALL LIGHT IN
REACH.

## 2022-04-15 NOTE — NUR
SHIFT SUMMARY:
 
HYPERTENSIVE /126 THIS MORNING; HYDRALAZINE GIVEN, RESULTING /109.
DR. STRICKLAND RE-STARTED LISINOPRIL, FIRST DOSE GIVEN; RESULTING BP THIS
AFTERNOON 126/81. LAST BM DOCUMENTED ON 4/11, SENNA GIVEN, NO RESULT YET.
WEARING POSEY VEST AND SOFT X 4 EXTREMITIES. ATE GOOD BREAKFAST, BUT LITTLE
LUNCH AND NO DINNER. INCONTINENT AT TIMES, WEARING ATTENDS. C/O PAIN IN R
SHOULDER BUT DECLINED OFFERED TYLENOL. AWAITING PLACEMENT.
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
## 2022-04-16 NOTE — NUR
PT IS ORIENTED ONLY TO SELF. VS STABLE. PT ORDER FOR 4 POINT SOFT RESTRAINTS
AND POSEY VEST RENEWED PER PROVIDER WHICH EXPIRES 4/16/22 AT 1836. PT WAS
INCONTINENT OF URINE x 1. PT WAS ADMINSTERED SEROQUEL AT 2319. PT IS CURRENTLY
ASLEEP WITH CALL LIGHT WITHIN REACH AND BED ALARM ACTIVATED.

## 2022-04-16 NOTE — NUR
PATIENT WAS TAKEN OFF OF RESTRAINTS THIS AM. SINCE THEN HE HAS BEEN CALM. HE
HAS BEEN COOPERATIVE WITH CARES, ATE MEALS AND HAS SHOWN NO AGGRESSION.
PATIENT APPEARS CONFUSED HOWEVER, UNSURE OF HIS SITUATION.

## 2022-04-16 NOTE — NUR
RESTRAINTS DC AT 1007. PATIENT'S LEGS WERE LEFT UNTIED, AND WHEN BREAKFAST
CAME, ONE ARM WAS UNTIED. HE WAS CALM AND COOPERATIVE. PT CAME TO WORK WITH
HIM. HE WAS UNTIED COMPLETELY AND WALKED THE HALLWAY, TALKING WITH STAFF,
COOPERATING WITH THERAPY. DR. ROSE WAS UPDATED AND APPROVED THE
DISCONTINUATION OF THE RESTRAINT. PATIENT IS NOW LYING IN BED, RESTING FREE OF
RESTRAINTS.

## 2022-04-17 NOTE — NUR
SHIFT SUMMARY
 
PATIENT IS ALERT AND ORIENTED TO SELF. PATIENT HAS BEEN A ONE PERSON ASSIST TO
BATHROOM. PATIENT HAS HAD NO ACUTE EVENTS THIS SHIFT. PATIENT HAS BEEN
COOPERATIVE WITH CARE THIS SHIFT. VITAL SIGNS REVIEWED. BED IN LOCKED AND
LOWEST POSITION. CALL LIGHT IN PLACE. WILL MONITOR UNTIL SHIFT CHANGE.
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
## 2022-04-18 NOTE — NUR
CHARGE NURSE REPORTS PATIENT PLACED IN FOUR POINT RESTRAINTS, FOUR RAILS, AND
POSEY VEST FOR INCREASED AGITATION AND COMBATIVE. CHARGE RN REPORTS SECURITY
CALLED TO HELP PATIENT BACK INTO BED AND APPLY RESTRAINTS PER ORDER.
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
## 2022-04-18 NOTE — NUR
SHIFT SUMMARY
PT AxOx1-2. MOOD SWINGS AND AGGRESSIVE BEHAVIOR ON AND OFF THIS SHIFT. PT
WAS IN 4 POINT SOFT RESTRAINTS, WITH 4 BED RAILS UP AND A POSEY VEST IN PLACE
AT START OF SHIFT. PT TRIALED OUT OF 4 POINT RESTRAINT TO EAT BREAKFAST THIS
AM. PT DID WELL TOLERATING BEING OUT OF WRIST RESTRAINTS. EVENTUALLY HE BECAME
FIXATED ON GETTING OUT OF HIS SEYMOUR VEST, WHICH HE DID REMOVE x3. PT MEDICATED
PER EMAR FOR AGGRESSION AS WELL AS DISTRACTED/REDIRECTED MUCH AS POSSIBLE. PER
DR PAEZ, WILL INCREASE PT'S NIGHT TIME MEDS TO HELP WITH SLEEP. PT
REFUSED TO ALLOW CNA TO GET AFTERNOON VITALS. PT CURRENTLY SITTING UP IN CHAIR
WITH SEYM[place] VEST IN PLACE. PT TALKING CALMLY TO STAFF. CALL LIGHT IN REACH.
ACTIVITY CART ITEMS AT BEDSIDE.

## 2022-04-18 NOTE — NUR
CHARGE NURSE REPORTS: PATIENT HAD WITNESS FALL, VITALS TAKEN, HOSPITALIST
NOTIFIED, POST FALL ASSESSMENT COMPLETED, AND SUPERVISOR-FAMILY NOTIFIED.
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
## 2022-04-19 NOTE — NUR
VIEW SCORE HIGH. RESP DOWN NOW TO 20. SHALLOW, RELAXED. ON R/A. PT QUIET AT
THIS TIME. H/T IS ELEVATED, BUT NORMAL FOR PT. BP BACK TO WNL AT THIS TIME. PT
TALKING AND BEING PLEASANT WITH STAFF AT THIS POINT. BED IN LOW POSITION, CALL
LITE IN REACH, BED ALARM AND RESTRAINTS ON FOR SAFETY
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
## 2022-04-19 NOTE — NUR
PT STRUGGLING TO CHEW SOFT BITE DIET. CHEWS FOR LONG TIME, BUT NO BOTTOM
TEETH. ADJUSTED DIET TO MECH SOFT TOSEE IF HELPS.
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
## 2022-04-19 NOTE — NUR
PT LABILE MOODS TODAY. SOME QUIET, TALKING, SOME CONFRONTATIONAL. SOME
YELLING. SOME KICKING, THEN CALMED DOWN. MED PER EMAR. CONTINUES IN
RESTRAINTS TODAY. IS USING URINAL TO SOME DEGREEE. DOES REMEMBER WAS A
. NO NEW CONCERNS NOTED. BED IN LOW POSITION, CALL LITE IN REACH, BED
ALARM ON FOR SAFETY

## 2022-04-20 LAB
ANION GAP SERPL CALCULATED.4IONS-SCNC: 3 MMOL/L (ref 6–16)
BASOPHILS # BLD AUTO: 0.03 K/MM3 (ref 0–0.23)
BASOPHILS NFR BLD AUTO: 1 % (ref 0–2)
BUN SERPL-MCNC: 26 MG/DL (ref 8–24)
CALCIUM SERPL-MCNC: 10.8 MG/DL (ref 8.5–10.1)
CHLORIDE SERPL-SCNC: 112 MMOL/L (ref 98–108)
CO2 SERPL-SCNC: 29 MMOL/L (ref 21–32)
CREAT SERPL-MCNC: 1.23 MG/DL (ref 0.6–1.2)
DEPRECATED RDW RBC AUTO: 46.4 FL (ref 35.1–46.3)
EOSINOPHIL # BLD AUTO: 0.1 K/MM3 (ref 0–0.68)
EOSINOPHIL NFR BLD AUTO: 2 % (ref 0–6)
ERYTHROCYTE [DISTWIDTH] IN BLOOD BY AUTOMATED COUNT: 13.6 % (ref 11.7–14.2)
GLUCOSE SERPL-MCNC: 107 MG/DL (ref 70–99)
HCT VFR BLD AUTO: 38 % (ref 37–53)
HGB BLD-MCNC: 11.9 G/DL (ref 13.5–17.5)
IMM GRANULOCYTES # BLD AUTO: 0.03 K/MM3 (ref 0–0.1)
IMM GRANULOCYTES NFR BLD AUTO: 1 % (ref 0–1)
LYMPHOCYTES # BLD AUTO: 1.06 K/MM3 (ref 0.84–5.2)
LYMPHOCYTES NFR BLD AUTO: 20 % (ref 21–46)
MCHC RBC AUTO-ENTMCNC: 31.3 G/DL (ref 31.5–36.5)
MCV RBC AUTO: 93 FL (ref 80–100)
MONOCYTES # BLD AUTO: 0.62 K/MM3 (ref 0.16–1.47)
MONOCYTES NFR BLD AUTO: 12 % (ref 4–13)
NEUTROPHILS # BLD AUTO: 3.39 K/MM3 (ref 1.96–9.15)
NEUTROPHILS NFR BLD AUTO: 65 % (ref 41–73)
NRBC # BLD AUTO: 0 K/MM3 (ref 0–0.02)
NRBC BLD AUTO-RTO: 0 /100 WBC (ref 0–0.2)
PLATELET # BLD AUTO: 227 K/MM3 (ref 150–400)
POTASSIUM SERPL-SCNC: 4 MMOL/L (ref 3.5–5.5)
SODIUM SERPL-SCNC: 144 MMOL/L (ref 136–145)

## 2022-04-20 NOTE — NUR
SHIFT SUMMARY-
PT FLAT AFFECT WHILE AWAKE DURING SHIFT. PT ASLEEP FOR MOST OF SHIFT. PT WILL
AWAKE WITH PERSISTENT STIMULOUS. PT APPETITE BETTER TOWARDS END OF SHIFT. PT
FEEDING SELF WILLINGLY NOW. PT CONTINENT AT BEGINING OF SHIFT. PT
INCONTINENT MIDDLE OF SHIFT. NURSE CALLED DR. ZAMBRANO REGARDING SLEEPY
AFFECT OF SERAQUEL PRESCIBED.  IS AWARE OF SLEEPY AFFECT OF PT
THROUGHOUT DAY. ONGOING HTN CONTROLED WITH ROUTINE/PRN PRESCRIBED.
PT PLEASANT AND EATING IN ROOM AT END OF SHIFT
WITH CALL LIGHT NEAR AND
BEDRAILS/BED ALARM ENGAGED.
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
## 2022-04-21 NOTE — NUR
SHIFT SUMMARY
 
PT DROWSY BUT AWAKENS EASILY, ORIENTED X2, PT RESTED WELL THROUGHOUT THE
SHIFT, % DINNER.  PT INCONTINENT OF URINE X 1 AND VOIDED INTO URINAL X1
WITH ASSIST.  NO C/O PAIN OR NAUSEA, REPOSITIONED Q 2 HRS FOR COMFORT AND TO
PREVENT SKIN BREAKDOWN.  VSS, CONTINUE POSEY VEST RESTRAINT AT THIS TIME DUE
TO INTERMITTENT PERIODS OF AGITATION AND IMPULSIVENESS.  ANTICIPATE D/C WHEN
MEDICALLY STABLE.

## 2022-04-21 NOTE — NUR
SHIFT SUMMARY
 
PATIENT IS ALERT AND ORIENTED TO SELF. PATIENT HAS BEEN IN POSEY VEST ALL
SHIFT. RESTRAINT ORDER EXPIRES TOMORROW MORNING 0845. PATIENT SLEPT THROUGH
MOST OF MORNING. WAKING UP JUST FOR SLIGHT BREAKFAST AND MEDICATIONS. PATIENT
IS IN HALLWAY CHAIR BEING SUPERVISED. PATIENT HAS HAD NO ACUTE EVENTS THIS
SHIFT. VITAL SIGNS REVIEWED. PATIENT HAS HAD NO COMPLAINTS OF SOB, NAUSEA,
VOMITTING, OR PAIN THIS SHIFT. BED IN LOWEST AND LOCKED POSITION. CALL LIGHT
IN PLACE. WILL MONITOR UNTIL SHIFT CHANGE.

## 2022-04-22 NOTE — NUR
SHIFT SUMMARY
 
PT RESTED WELL, NO C/O PAIN, BK PO, REPOSITIONED FOR COMFORT AND TO PREVENT
SKIN BREAKDOWN.  PT CALM, COOPERATIVE WITH A FEW BRIEF EPISODES OF AGITATION
NOTED WHEN REPOSITIONED OR ADL'S COMPLETED.  PT ORIENTED TO SELF ONLY.  VSS,
ANTICIPATE D/C WHEN PLACEMENT DETERMINED.

## 2022-04-22 NOTE — NUR
SHIFT SUMMARY
 
PATIENT IS ALERT AND ORIENTED TO SELF. PATIENT HAS HAD NO COMPLAINTS OF PAIN,
NAUSEA, SOB OR VOMITTING THIS SHIFT. PATIENT HAS HAD VEST RESTRAINT RENEWED AT
0758 THIS MORNING, DOCUMENTATION UP TO DATE. PATIENT HAS HAD NO ACUTE EVENTS
THIS SHIFT. VITAL SIGNS REVIEWED. BED IN LOCKED AND LOWEST POSITION. WILL
MONITOR UNTIL SHIFT CHANGE
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
## 2022-04-23 NOTE — NUR
SHIFT SUMMARY
 
PT HAS BEEN UPRIGHT IN BED ALL SHIFT.  PT IS RESTRAINED IN A POSEY VEST, ORDER
WAS OBTAINED TO REISSUE RESTRAINT THIS MORNING.  PT HAS BEEN SOMEWHAT
IRRITABLE TODAY, BUT MOSTLY FOCUSED ON REMOVING THE STRAPS THAT ARE TIED TO
THE BED. HE SUCCESSFULLY REMOVED THE LEFT SIDED STRAP TWICE TODAY.  PT IS EASY
TO REDIRECT.  PT WAS PROVIDED HIS CALL LIGHT AND FLUIDS THROUGHOUT THE DAY.
BED ALARM IS ON FOR SAFETY.  WILL CONTINUE TO MONITOR UNTIL NOC SHIFT CHANGE.

## 2022-04-24 NOTE — NUR
NIGHT SHIFT SUMMARY
 
ADMITTED FOR DEMENTIA AND MOOD DISTURBANCES. PT IS A DNR. HE HAS BEEN LABILE
IN MOOD DURING THE SHIFT, GOING FROM COMBATIVE TO APOLOGETIC. PT HAS BEEN IN A
POSEY THE WHOLE SHIFT AND ATTEMPTED CLIMBING OUT OF BED AT THE START OF THE
SHIFT. HE WAS VERY COMBATIVE WITH ATTEMPTED REDIRECTION BY STAFF. PT BEGAN
KICKING AND TRYING TO PUNCH STAFF SO AN ORDER WAS OBTAINED FOR LOCKED 4
EXTREMITY RESTRAINTS. PT MEDICATED WITH IM ZYPREXA X1 AND MONITORED WHILE IN
RESTRAINTS. HE BECAME APOLOGETIC TO STAFF AND BEGAN CRYING AND COMPLAINING OF
RIGHT SHOULDER PAIN. RESTRAINTS WERE REMOVED AND PT WAS AGREEABLE TO TAKING
THE MISSED DOSE OF SEROQUEL AND SOME TYLENOL FOR HIS SHOULDER. PT HAS BEEN
SLIDING DOWN IN THE BED THROUGHOUT THE SHIFT AND HAS BEEN REPOSITIONED
MULTIPLE TIMES, EACH TIME HIS SPEECH MORE SLEEPY AND MOOD HAPPIER. PT IS
CURRENTLY SLEEPING WITH HIS TV ON.

## 2022-04-24 NOTE — NUR
DAY SHIFT SUMMARY
 
PT IN BED ASLEEP AT THE BEGINNING OF THE SHIFT.  HE IS IN A POSEY RESTRAINT
VEST AND HAS 4 SIDE RAILS UP PER PROVIDER ORDER.  PT IS AOX1.  PT IS A DNR.
PT HAS BEEN COMPLIANT TODAY, SOME SHORT LIVED MOMENTS OF AGITATION. PT WAS
ATTEMPTING TO SLIDE OUT OF THE POSEY AND SUCCESSFULLY UNTIED ONE OF THE
STRAPS.
PT WAS
GIVEN ZYPREXA 5MG ODT THIS AFTERNOON AND WAS MONITORED WHILE IN RESTRAINTS.
HIS WIFE CALLED THIS AFTERNOON AND REQUESTED AN UPDATE.  PT HAS BEEN CALM AND
NAPPING MOST OF THE AFTERNOON.  WILL CONTINUE TO ASSESS AND MONITOR UNTIL NOC
SHIFT TRANSFER.

## 2022-04-25 NOTE — NUR
SHIFT SUMMARY:
 
PT IS A/OX1. HE IS CURRENTLY IN A POSEY VEST. PT WAS CALM AND COOPERATIVE WITH
CARE THIS NOC SHIFT. HE DID TAKE HIS SCHEDULED PO MEDS AND DID NOT NEED ANY
PRN MEDS. HIS BED IS IN THE LOWEST POSITION AND ALARM IS SET FOR PT SAFETY.

## 2022-04-25 NOTE — NUR
SHIFT SUMMARY
 
PATIENT DENIES PAIN, NAUSEA, AND SHORTNESS OF BREATH. PATIENT IS IN A POSEY
VEST. PATIENT SLEPT ON AND OFF TODAY. DID WAKE UP FOR MEALS AND MEDICATIONS.
PATIENT WAS MOSTLY PLEASANT. PATIENT HESITANT WITH CARE. PATIENT FEEDING
HIMSELF. EATING AND DRINKING WELL. GERA  CALLED TO UPDATE CARE
MANAGER, THIS RN TOOK THE CALL DUE TO CARE MANAGER BEING GONE FOR THE DAY. GERA
STATED THE APPLICATION FOR MEDICAID HAS OFFICIALLY BEEN FILED. WIFE OF PATIENT
ALSO CALLED FOR AN UPDATE.

## 2022-04-26 NOTE — NUR
SHIFT SUMMARY
PT AXO TO SELF ONLY AND HAS DIFFICULTY RECALLING HIS DATE OF BIRTH. VSS.
PLEASANT AND COOPERATIVE WITH CARE THOUGH GETS FRUSTRATED WHEN HE STANDS UP
AND THE BED ALARM RINGS. UP WITH 1 ASSIST WITH FWW CHELF GB. RESTRAINTS DC'D
THIS AM AT 1115 R/T PT BEING CALM AND NOT ATTEMPTING OOB. PT UP TO CHAIR WITH
ALARM ON AND REMOTE VIDEO MONITORING. SO ACUTE CHANGES THIS SHIFT. BED IN LOW
POSITION, CALL LIGHT WITHIN REACH. AS THE DAY GOES ON, PT STANDS UP AND CHAIR
ALARM SOUNDS FREQUENTLY. PT STANDS AND TURNS OFF ALARM ON HIS OWN.

## 2022-04-26 NOTE — NUR
SHIFT SUMMARY:
 
PT WAS MOSTLY COMPLIANT WITH CARE THIS SHIFT. HE DID BECOME AGITATED WHILE
TRYING TO REPOSITION HIM. THE PT REMAINS IN THE POSEY VEST AND THE ORDER WAS
RENEWED @ 0330. THE PT DID NOT HAVE A BM. THE BED IS IN THE LOWEST POSITION
AND ALARM IS SET. WE'LL CONTINUE TO MONITOR.

## 2022-04-27 NOTE — NUR
SHIFT SUMMARY
82 YR M ADMITTED ON 4/2/22. DNR. NO ACUTE CHANGES OVERNIGHT. PT HAD AN
UNEVENTFUL EVENING AND APPEARED TO HAVE RESTED WELL. HE WAS PLEASANT AND
COOPERATIVE AS WELL. AT APPROX 0515 HE BECAME A BIT AGGITATED WHEN HE BECAME
WEAK WHILE TRYING TO USE THE RESTROOM AND HAD TO HAVE HELP BACK TO BED. HE DID
HOWEVER LAY BACK DOWN WITH NO RESISTANCE OR ISSUES. PT WAS NOT IN RESTRAINTS
AT ALL THIS SHIFT.

## 2022-04-27 NOTE — NUR
PT IS ALERT ORIENTED TO SELF AND SURROUNDINGS. THE PT HAS BEEN UP IN THE CHAIR
AND BACK TO THE BED MULTIPLE TIMES TODAY. THE PT IS SOMEWHAT UNSTEADY ON HIS
FEET AND NEEDS ASSISTIANCE WITH AMBULATION. THE PT HAS BEEN MOSTLY COOPERATIVE
TODAY. AND SO FAR HAS NOT REQUIRED RESTRAINTS. THE APPEARS TO BE BREATHING
EASILY ON RA AT THIS TIME. CALL LIGHT IN REACH. CHAIR AND BED ALARM IN USE AT
ALL TIMES

## 2022-04-28 NOTE — NUR
PT IS ALERT ORIENTED TO SELF AND SURROUNDINGS. THE PT IS UP WITH ASSIST TO THE
CHAIR AND TO THE BATHROOM. THE PT WAS UP TO THE CHAIR FOR MEALS. PT HAS BEEN
COOPERATIVE. PT HAD SOME MILD IRRIATABILTY AT TIMES TODAY. PT APPEARS TO BE
BREATHING EASILY ON RA. BED ALARM AND CHAIR ALARM IN USE. CALL LIGHT IN REACH.
WILL CONTINUE TO MONITOR AND ASSESS FOR CHANGES

## 2022-04-28 NOTE — NUR
AM ASSESSMENT
I AGREE WITH THE STUDENT RN MILTON'S AM ASSESSMENT AND WAS PRESENT DURING THE
ASSESSMENT. AND HAVE REVIEWED AND AGREE WITH THE STUDENT DOCUMENTATION

## 2022-04-28 NOTE — NUR
SHIFT SUMMARY
82 YR M ADMITTED ON 4/2/22. DNR. NO ACUTE CHANGES THIS SHIFT. PT HAD A GOOD
NIGHT WITH NO EPISODES OF ANGER OR DEPRESSION. HE SAT IN THE CHAIR AND WATCHED
TV FOR SEVERAL HOUS AND TOOK A SHORT WALK DOWN THE HALLWAY. HE HAS BEEN
PLEASANT AND COOPERATIVE. HE CONTINUES TO ASK IF HIS WIFE, GENE, IS COMING.
HE PREFERS TO BE INDEPENDANT, BUT A STAND BY ASSIST IS STILL NECESSARY AS HE
HAS PERIODS OF WEAKNESS AND UNSTEADINESS.

## 2022-04-29 NOTE — NUR
CALLED DR PRESCOTT-
WHEN RN WENT TO DO MORNING MEDICATIONS, THE PT WAS VERY SLEEPY, REPEAT VIATSL
CHECKED FOR MEDICATION ADMINISTRATION AND WERE FOUND TO BE LOW LOWER THAN
PREVIOUS. MANUAL BP RA WAS 82/45 LA WAS 75/? INAUDIBLE DIASTOLIC. DR NOTIFIED
AND RECIEVED ORDER FOR FLUID BOLUS.

## 2022-04-29 NOTE — NUR
SHIFT SUMMARY-
PT ALERT AND ORIENTED TO SELF. PT HAD AN EPISODE OF HYPOTENSION THIS
AFTERNOON, BP STABLE AND A LITTLE ELEVATED THIS EVENING. PT FREQUENTLY STANDS
UP, WHEN SITTING IN THE CHAIR, WHEN THE CHAIR ALARM GOES OFF HE FEELS THE NEED
TO TURN IT OFF, ONCE HE TURNS IT OFF HE APPARENTLY FORGETS WHAT HE STOOD UP
FOR AND SITS BACK IN THE RECLINER. PT AMBULATED IN THE HALLS TODAY WITH
PHYSICAL THERAPY AND HAD NO ISSUES THIS EVENING AMBULATING IN THE HALLS WITH
THE CNA. PT CURRENTLY SITTING IN THE RECLINER ON ROOM AIR NO S&S OF DISTRESS
NOTED, MEDS WHOLE WITH WATER, LOVENOX FOR DVT PROPHYLAXIS. IV SL AT THIS TIME
WILL CTM AND PASS ON TO NIGHT RN IN BEDSIDE REPORT.

## 2022-04-29 NOTE — NUR
SHIFT SUMMARY
82 YR M ADMITTED ON 4/2/22. DNR. NO ACUTE CHANGES THIS SHIFT. AT ONE POINT PT
BECAME AGITATED AND THREATENED TO HIT A STAFF MEMBER, BUT HE CALMED DOWN RIGHT
AWAY. OTHER THAN THAT INCIDENT, HE HAS BEEN EVEN TEMPERED, PLEASANT, AND
COOPERATIVE. HE TOOK A WALK DOWN THE [place] AND SAT DOWN IN A CHAIR IN THE
HALLWAY AND FELL ASLEEP FOR ABOUT AN HOUR. HE THEN GOT UP AND WENT TO BED W/O
INCIDENT. MEDS TAKEN WHOLE W/ WATER. BED IN LOW POSITION AND CALL LGHT WITHIN
REACH.

## 2022-04-30 NOTE — NUR
SHIFT SUMMARY-
NO ACUTE EVENTS T/O SHIFT. PT WAS GENERALLY PLEASANT AND COOPERATIVE. HE KEPT
GETTING OUT OF HIS CHAIR WITHOUT CALLING US AND BECOMING ANGRY WITH THE ALARM,
TURNING IT OFF HIMSELF. AT ONE POINT HE PUNCHED THE ALARM A FEW TIMES. NO
ANGER HAS BEEN DIRECTED AT THE STAFF TODAY. PT WAS NOTICED COUGHING DURING
MEALTIME AND MAY BE POCKETING FOOD.

## 2022-04-30 NOTE — NUR
CARE MANAGEMENT STATED THE PT HAS PLANS TO BE DISCHARGED TO [place]
WEDNESDAY OF NEXT WEEK. SPOKE TO DR PRESCOTT HE IS AWARE.

## 2022-04-30 NOTE — NUR
DR PRESCOTT ATTEMPTED TO CALL THE PT SPOUSE TO DISCUSS THE PT DISCHARGING HOME.
RECIEVED A CALL BACK FROM GERA WHO STATED SHE IS A CARE COORDINATOR FOR [facility] [place] LEFT A CONTACT NUMBER [phone]. CALLED DR PRESCOTT AND HE
IS AWARE OF THIS MESSAGE. PER GERA ALL CARE COORDINATION CALLS FÁTIMAMARY GO THROUGH
HER NOT THE PT WIFE.
 
CONTACTED CARE MANAGEMENT AND SPOKE ABOUT THIS MESSAGE, PROVIDED THEM WITH
CONTACT INFO. CAN NOT FIND ANY DOCUMENTAION SUPPORTING THIS AT THIS TIME. CARE
MANAGEMENT WILL LOOK INTO THIS ISSUE.

## 2022-04-30 NOTE — NUR
SHIFT SUMMARY-
PT HAS HAD NO ACUTE CHANGE T/O THE SHIFT. PT HAS REMAINED COOPERATIVE WITH
CARE AND HAS SHOWN NO SIGN OF RESTLESS BEHAVIOR. PT ALERT AND ORIENTED TO
SELF. VSS T/O THE DAY TODAY. PLAN IS FOR DISCHARGE WEDNESDAY OF NEXT WEEK TO
[place]. SPOUSE WOULD LIKE TO BE CALLED WITH UPDATES BUT NO CALLS TO HER
REGUARDING DISCHARGE PLANNING. PT HAS BEEN PLEASENT T/O THE DAY TODAY GETING
UP AND DOWN TO THE BATHROOM A FEW TIMES, CONTINENT AND INCONTINENT. PT
CURRENTLY A SBA WITH AMBULATION FOR SAFETY. PT IN BED AT THIS TIME WITH THE
BED ALARM SET FOR SAFETY, CALL LIGHT IN REACH. NO S&S OF DISTRESS NOTED. WILL
CTM AND PASS ON TO NIGHT RN IN REPORT.

## 2022-04-30 NOTE — NUR
SUMMARY
 PT SLEPT
WELL T/O THE SHIFT. THERE WERE NO ACUTE EVENTS. PT MEDICATED PER EMAR
FOR CHRONIC SHOULDER PAIN. PT CURRENTLY SLEEPING IN NO DISTRESS, CALL LIGHT IN
REACH.

## 2022-05-01 NOTE — NUR
SHIFT SUMMARY:
 
PT WAS COOPERATIVE WITH MOST CARE. HE DOES NOT USE THE CALL LIGHT FOR NEEDS
AND GETS UPSET AT THE ALARM ONCE IT'S SET OFF. HE CONTINUES TO BE A 1 PA W/
FWW TO BR; THE PT OVERESTIMATES HIM LIMITATIONS WHEN AMBULATING. ALTHOUGH HE
DID NOT GET ANGRY/UPSET WITH STAFF, HE WAS MORE ACTIVE THIS NOC SHIFT AND
NEEDED FREQUENT MONITORING DUE TO HIS IMPULSIVENESS. THE BED IS IN THE LOWEST
POSITION AND THE ALARM IS SET FOR PATIENT SAFETY. WE'LL CONTINUE TO MONITOR
THE REMAINDER OF THE SHIFT.

## 2022-05-01 NOTE — NUR
SHIFT SUMMARY-
PT WAS A LITTLE IRRITABLE THIS AFTERNOON AND RECIEVED A PRN ZYPREXA. PT HAS
SINCE THEN IMPROVED. HE IS EASILY AGRIVATED WITH PEOPLE ATTEMPTING TO KEEP HIM
FROM FALLING. PT DOES NOT LIKE PEOPLE BEING CLOSE TO HIM. PT FREQUENTLY IS
IMPULSIVE AND SETS OFF THE BED AND CHAIR ALARMS. PT IS REDIRECTABLE BUT AT
TIMES HAS A ONE TRACK MIND AND CAN NOT CONVEY WHAT HE WANTS IN WORDS. PT IS
ALSO SLOW TO RESPOND AT TIMES.
PT CURRENTLY SITTING UP IN THE RECLINER WAITING FOR A CUP OF COFFEE.

## 2022-05-01 NOTE — NUR
PT MEDICATED PRN-
PT WAS GETTING UPSET ABOUT HIS LACK OF UNDERSTANDING. PT IS CONFUSED AND DOES
NOT UNDERSTAND WHY HE NEEDS STAFF TO ASSIST HIM WITH AMBULATION (PT IS WEAK
AND A BIT WOBBLY ON HIS FEET) THE ALARMS MAKE HIM IRRITATED BUT ARE NEEDED FOR
SAFETY. PT HAVING DIFFICULTY CONVEYING WHAT HE WANTS BECAUSE THE THOUGHT GOES
AWAY SHORTLY AFTER HE HAS IT. PT IS BECOMING IRRITABLE WITH HIMSELF AND STAFF
SO WAS OFFERED A MEDICATION FOR THIS. PT TOOK THE MEDICATION WILLINGLY. PT WAS
THEN ASSISTED TO THE BATHROOM FOR A SHOWER AND SHAVE. WILL CTM.

## 2022-05-02 NOTE — NUR
Wife Klarissa called & discussed plan of care. Discussed behaviors agression
requiring meds PRN & changes in RX. PT has advanced dementia & agressive
behaviors & hypertension with PRN rx to treat. She verbalized understanding.

## 2022-05-02 NOTE — NUR
THE PT HAD BECOME MORE AGITATED AND MORE DIFFICULT TO REDIRECT PRIOR TO SHIFT
CHANGE. PRN PO ZYPREXA WAS GIVEN WITH LITTLE EFFECT. AS THE NOC SHIFT
PROGRESSED THE PT STILL REMAINED IRRITABLE AND VERBALLY AGGRESSIVE TOWARDS
STAFF. 10 MG OF IM ZYPREXA WAS ADMINISTERED @ 2256 AND THE PT IS NOW RESTING
COMFORTABLY. WE'LL CONTINUE TO MONITOR AND ALARM IS SET FOR PT SAFETY.

## 2022-05-02 NOTE — NUR
SHIFT SUMMARY:
 
PT HAS BEEN EXTREMELY AGITATED, VERBAL, AND THREATENED VIOLENCE THROUGHOUT
THIS SHIFT. HE CONTINUALLY TRIED TO GET OOB ON HIS OWN AND WOULD TRY TO REFUSE
HELP TO THE BR. AT TIMES HE IS COOPERATIVE WITH CARE, BUT CAN EASILY SWITCH
TO BECOMING IRRATE AND UNCOOPERATIVE. THE PT'S WIFE DID CALL THE RN FOR AN
UPDATE AT THE BEGINNING OF THE SHIFT. SHE STATES THAT HIS LOWER DENTURES HAVE
NOT BEEN FOUND SINCE HIS ADMISSION TO THE FLOOR? THE BED IS IN THE LOWEST
POSITION AND THE ALARM IS SET.

## 2022-05-03 NOTE — NUR
82 year old Dementia PT with DNR status continues to be impulsive & high fall
risk. He attempts to climb out of bed multiple times throwing le over side
rail & when attempting to redirect he becomes verbally abusive & refuses
assist. Urgency with urination, refuses assist with urinal & offers of
toileting. incontinent up to the bathroom with moderate assist but does not
stay on toilet to void. PRN seroquel taken whole in applesauce. Medicated for
suspected pain with tylenol 650 mg with helpful effect. Wife called & updated.
plan to dc to memory care unit on DC. Fall & aspiration precautions continue.

## 2022-05-04 NOTE — NUR
NO PRN RX NEEDED PT MORE COOPERATIVE. HE DID SLEEP MOST OF SHIFT. UP THIS AM
TO BATHROOM WITH BENJAMIN FWW. WIFE UPDATED. APPEARS CONSTIPATED WILL GIVE BOWEL
CARE PRN. ROOM AIR, APRIRATION PRECAUTIONS FALL PRECAUTIONS.

## 2022-05-05 NOTE — NUR
81 year old PT with dementia with behavioral disturbance continues to show
improvement with decreased need for PRN rx to tx behaviors. No agression,
increased oral intake. Able to feed self with setup & supervision. Continues
at risk for aspiration & has modified diet. He is cooperative with meds & has
fair oral intake. Contines to await placement at [facility]

## 2022-05-06 NOTE — NUR
Patent with a fall at the beginning of the shift. See fall assessment. patient
stable overnight. No s/s of injury. Fall reported to Dr. Pedraza and his wife
Klarissa. Patient with low grade temp and erratic BP readings overnight. Did not
give PRN BP medications due to low BR readings previously in the night.
Patient calmly sleeping the majority of the night.

## 2022-05-06 NOTE — NUR
SHIFT SUMMARY
PT A&O X4, MOOD UP AND DOWN T/O SHIFT. PT BECAME TEARFUL DURING SHIFT, OVERALL
IN A GOOD MOOD T/O SHIFT. PT AMBULATED 2X IN [place], UP TO BATHROOM FOR NEEDS.
HPTN, OTHER VSS. CALL LIGHT W/IN REACH. RESTED IN BED T/O SHIFT, UP TO CHAIR
FOR MEALS. 1X W/ FWW/GB. BED ALARM ACTIVATED.

## 2022-05-07 NOTE — NUR
patient with HtTN overnight. PRN medications SABRA.  notes state that Patient
OK to run HTN. patient ambulated with FWW to bathroom for BM. Voiding,
sometimes incontinent, sometimes in the urinal. Clear, yellow urine. Skin
inspection reveals right deltoid bruise. Right shoulder xray negative for
injury. Patient alert and conversive. Able to swallow HS medications whole
with water. Patient did threaten to punch staff during linen change but was
able to be calmed.

## 2022-05-07 NOTE — NUR
SHIFT SUMMARY
PT A&O X4 AND IN PLEASENT MOOD T/O SHIFT. UP TO AMBULATE T/O SHIFT W/ FWW AND
GB. VSS. CALL LIGHT W/IN REACH. NO AGGRESSIVE BEHAVIOR THIS SHIFT. TOLERATED
PO INTAKE WELL.

## 2022-05-08 NOTE — NUR
SHIFT SUMMARY
PT A&O X3 AND IN PLEASENT MOOD T/O SHIFT. UP TO AMBULATE IN [place] W/ FWW/GB
ENJOYED VISITING W/ OTHER PT'S AND STAFF IN [place]. HTN MEDICATED PER EMAR,
OTHER VSS. TOLERATING PO INTAKE WELL. OVERALL TREMOR NOTED. CALL LIGHT W/IN
REACH. BED ALARM ON.

## 2022-05-08 NOTE — NUR
Patient with HTN overnight. Up in [place] with SBA assist, walked distance of
hallway x2. Patient up to bathroom and voiding per urinal overnight. Able to
swallow his HS medications with out issue. Patient awake and restless
throughout the night. patietn could not verbalize discomforts. Discussed his
vitals and notes with hs wife , Klarissa. All questions answered.

## 2022-05-09 NOTE — NUR
patient with VSS but HTN overnight, on RA. patient slept well. Able to take
his HS medicatiosn whole with water. Incontinent of urine throughout the
night. No new skin issues noted. Continue to wait placement.

## 2022-05-09 NOTE — NUR
END OF SHIFT SUMMARY:
PATIENT REPORTED SOME SHOULDER THIS MORNING. MEDICATED PER PRNS. PATIENT
REPORTED RELIEF.
DICUSSED BP MEDICATION AND BLOOD PRESSURE WITH DR. MARIEE IN THE MORNING.
PATIENT MEDICATED PER NEW ORDERS. PATIENT'S BLOOD PRESSURE STABLE THROUGHOUT
THE DAY. PATIENT DID NOT MENTION DIZZINESS OR FAINTNESS AND
WAS STEADY ON FEET WITH AMBULATION (WITH STAFF AND FWW).
PATIENT WAS SLEEPY THIS MORNING AND REPORTED THAT HE WAS TIRED. PATIENT WOKE
TO EAT BREAKFAST, BUT THEN SLEPT UNTIL UP TO THE CHAIR FOR LUNCH. THROUGHOUT
THE REST OF THE DAY, THE PATIENT WAS AWAKE, CALM AND COOPERATIVE. PATIENT
SMILED WITH THE STAFF AND WAS COMPLIANT WITH CARE. PATIENT ACCEPTED HELP AND
USED THE FWW AS INSTRUCTED.
PATIENT UP TO THE CHAIR FOR LUNCH, DINNER AND THE AFTERNOON. PATIENT AMBULATED
WITH STAFF TO THE BATHROOM USING THE FWW. TREMOR NOTED IN HANDS/ARMS WHEN
EATING DINNER.

## 2022-05-09 NOTE — NUR
VARIABLE BP:
PATIENT HAS HAD A VARIETY OF BLOOD PRESSURES THIS MORNING. SEE VITALS. PATIENT
HAS NOT WANTED TO BE OUT OF BED THIS MORNING, REPORTING THAT HE IS TOO TIRED.
PATIENT HAS A MINIMAL DROOP OF RIGHT EYE THAT CNA REPORTS IS NEW.  PATIENT
SMILES EVENLY, HAS EQUAL STRENGTH IN BILATERAL EXTREMITIES, AND HIS EYES
FOLLOW STAFF. PATIENT IS CALM AND FOLLOWING ORDERS.  DISCUSSED WITH DR. MARIEE. NEW ORDERS RECEIVED.

## 2022-05-10 NOTE — NUR
NO ACUTE CHANGES THIS SHIFT. PATIENT CALM AND COOPERATIVE WITH CARE. WALKD
WITH PT IN THE HALLS WITH FWW, GB AND SBA. FALL PRECAUTIONS IN PLACE. PATIENT
DENIES ANY PAIN OR DISCOMFORT. VSS, ON RA. AWAITING PLACEMENT.

## 2022-05-10 NOTE — NUR
SHIFT SUMMARY
ADMITTED FOR AMS/SI. PLAN IS FOR PLACEMENT WHEN A FUTURE PLAN OF CARE CAN BE
AGREED UPON. 1 ASSIST W/BRP. A&O X1-2. RA. MECHANICAL SOFT DIET. HE WAS
PLEASANT AND COOPERATIVE WITH CARE THIS SHIFT. CONFUSED. HX:
ALZHEIMER'S/DEMENTIA.

## 2022-05-11 NOTE — NUR
82 year old Male with dementia with behavioral disturbances continues
cooperative with rx & he has no agressive BX this shift.Wife Klarissa  64
years calls for update. [place] planning seeking [place] PT's Wife says
she has attorneys assistant also working on securing medicaid.

## 2022-05-11 NOTE — NUR
SHIFT SUMMARY
 
NO ACUTE CHANGES THIS SHIFT. PT HAS HAD NO ISSUES WITH AGGRESSION. HAS BEEN
CALM AND COMPLIANT WITH CARE. ONLY COMPLAINED OF SOME SHOULDER PAIN WHEN
GETTING IN AND OUT OF BED. MEDICATED PER MAR. WILL CONTINUE TO MONITOR.

## 2022-05-12 NOTE — NUR
SHIFT SUMMARY
 
NO ACUTE CHANGES THIS SHIFT. PT HAD A SHAVE AND SAT OUT IN THE HALLWAY
ENJOYING Marvin. NO AGGRESSIONS. MIL PAIN, MEDICATED PER MAR.

## 2022-05-13 NOTE — NUR
SHIFT SUMMARY
 
 NO ACUTE CHANGES THIS SHIFT. PT HAS BEEN CALM AND PLEASANT. HAS NOT
COMPLAINED OF PAIN TODAY. HE HAS SPENT MUCH OF THE DAY NAPPING. WILL CONTINUE
TO MONITOR.

## 2022-05-13 NOTE — NUR
PT ALERT TO SELF AND PLACE, UNABLE TO STATE DATE. VERY PLEASANT AND ACCEPTING
OF STAFF ASSISTANCE. PATIENT ABLE TO SLEEP THROUGHOUT THE NIGHT. NO ACUTE
CHANGES NOTED.

## 2022-05-14 NOTE — NUR
PT NOTED WITH INTERMITTENT AGITATION GETTING OUT OF BED AND WALKING AROUND. PT
WAS REDIRECTABLE AND NO ADDITIONAL PRN MEDICATIONS GIVEN. PT INCONTINENT OF
URINE AND ABLE TO SLEEP MAJORITY OF TIME. BP NOTED TO BE SLIGHTLY HIGH HOWEVER
PER REVIEW OF VS PT HAS BEEN RUNNING HYPERTENSIVE AND BP MEDICATIONS DECREASED
PREVIOUSLY D/T PT FALL ON 5/5/22 D/T HYPOTENSION.

## 2022-05-14 NOTE — NUR
SHIFT SUMMARY
 
PATIENT IS ALERT AND ORIENTED TO SELF ONLY. PATIENT HAS BEEN PLEASENT AND
COOPERATIVE MOST OF SHIFT. PATIENT HAS HAD NO ACUTE EVENTS THIS SHIFT.  VITAL
SIGNS REVIEWED. PATIENT HAS BEEN NAPPING MOST OF DAY. BED IN LOCKED AND LOWEST
POSITION. CALL LIGHT IN PLACE. WILL MONITOR UNTIL SHIFT CHANGE.

## 2022-05-15 NOTE — NUR
SHIFT SUMMARY
 
PATIENT IS ALERT AND ORIENTED X1-2. PATIENT HAS BEEN PLEASENT AND COOPERATIVE
WITH CARE THIS SHIFT. PATIENT HAS BEEN RESTING MOST OF DAY. NO ACUTE EVENTS
THIS SHIFT. VITAL SIGNS REVIEWED. BED IN LOCKED AND LOWEST POSITION. CALL
LIGHT IN PLACE. WILL MONITOR UNTIL SHIFT CHANGE.

## 2022-05-15 NOTE — NUR
PT PLEASANTLY CONFUSED, ALERT TO SELF ONLY AT THIS TIME AS HE BELIEVES HE IS
HOME. PT IS CONT/INCONT. NO ACUTE CHANGES NOTED.

## 2022-05-16 NOTE — NUR
SHIFT SUMMARY
 
PATIENT IS ALERT TO SELF ONLY. PLEASANT AND COOPERATIVE WITH CARE. NO ACUTE
EVENTS THIS SHIFT. PATIENT HAS BEEN A 1 ASSIST TO THE BED/ RECLINER.
INCONTINENT/ CONTINENT. NO CHANGES IN PATIENT'S BLOOD PRESSURES. HAS BEEN
EATING WELL TODAY, NO BEHAVIORAL EVENTS. PATIENT IS CURRENTLY UP IN RECLINER.
CALL LIGHT WITHIN REACH.

## 2022-05-16 NOTE — NUR
PT ALERT TO SELF AND PLACE. PLEASANTLY CONFUSED AND COOPERATIVE WITH CARE. BP
ELEVATED HOWEVER PER VS REVIEW BP TREND HAS BEEN SUCH. PATIENT SLEPT
THROUGHOUT THE NIGHT WITH NO NEW CHANGES NOTED.

## 2022-05-17 NOTE — NUR
pt sitting up in chair with eyes closed, wakes easily, is pleasant and
cooperative with care, follows commands well, denies any complaints, states he
feels ok, is sleepy, knows where he is, lungs are clear t/o, resp even and
unlabored, no cough noted, hrr, murmur noted, 2+edema noted to b/l le, ppp+1,
cap refill <3sec, vs stable, afebrile, btx4, abd round slightly firm, voids
via urinal, skin c/w/d, maew, sanket, call light in reach.

## 2022-05-17 NOTE — NUR
pt has slept in his chair most of the day, wakes easily and is cooperative
with care. no acute changes this shift. call light in reach.

## 2022-05-18 NOTE — NUR
SUMMARY- PT ALERT TO SELF AND PLACE, NOT TO DATE OF PRESIDENT. TOLERATING FOOD
AND FLUID. GETS UP THE CHAIR FOR MEALS AND TO THE BATHROOM TO VOID. HAS BEEN
HAVING [employer] POST VOID RESIDUALS. GETTING PT UP TO BATHROOM TO VOID ON TOILET Q2.
NOT EMPTYING BLADDER, BUT CONT TO VOID. PLAN FOR MEMORY CARE, ARRANGEMENTS
BEING MADE THROUGH CARE MANAGEMENT.

## 2022-05-19 NOTE — NUR
PATIENTS MOOD WAS COOPERATIVE AND NEUTRAL TODAY, SOMEWHAT WITHDRAWN. HE DENIES
PAIN. LS HAVE SOME CRACKLES. NO CATH NEEDED AS PATIENT VOIDS AND BS RESULTS IN
LESS THAN 500. WAITING ON PLACEMENT YET, AS MEMORY IS IMPAIRED. [Annual] : an annual visit.

## 2022-05-19 NOTE — NUR
PT HAD LARGE BOWEL MOVEMENT AFTER FINISHING [employer].  PT HANDS AND GOWN
COVERED IN FECES.  PT GETS AGGITATED HAVING STAFF IN HIS ROOM WHILE HE IS
USING THE BATHROOM.  PT SLEPT ALL NIGHT WITH NO PRN'S, PT MORE CALM WITH LESS
STAFF AT BEDSIDE.

## 2022-05-20 NOTE — NUR
PATIENT WAS TEARY DURING THIS SHIFT,FOR A MOMENT,
BUT WOULDN'T TALK ABOUT WHY. HE DID CHEER
UP AND SMILE ONCE ICE CREAM WAS MENTIONED. NO MEDICAL CHANGES TO REPORT.

## 2022-05-20 NOTE — NUR
PT UNABLE TO SLEEP TILL 0100. PACING ROOM AND MOVING FROM CHAIR TO BED.  PT
STATES HE IS ANXIOUS TO GET HOME AND UPSET HIS WIFE HAS NOT VISITED HIM.
SPOKE WITH WIFE ON THE PHONE FOR UPDATES WHO SHARRED HE GETS UPSET WHEN SHE
VISITS AND WOULD PREFER NOT TO UPSET HIM.

## 2022-05-21 NOTE — NUR
SHIFT SUMMARY
82 YR M ADMITTED ON 4/2/22. DNR. PT HAS HAD A GOOD NIGHT THIS SHIFT. NO ACUTE
CHANGES. HE HAS BEEN PLEASANT AND COOPERATIVE AND HAS BEEN UP TO THE BATHROOM
WITH NO INCIDENCES OF INCONTINENCE. THIS NURSE SPOKE W/ HIS WIFE Geo AND
UPDATED HER ON HIS CONDITION. SHE FEELS BAD THAT SHE HASN'T VISITED BUT SHE
FEELS THAT IT WILL SET HIM OFF IF SHE COMES HERE AND HE CAN'T LEAVE WITH HER.

## 2022-05-21 NOTE — NUR
SHIFT SUMMARY
 
PT HAS BEEN PLEASANT TODAY, OBEYS COMMANDS WELL, AND FOLLOWS DIRECTIONS.  HE
HAS BEEN IN HIS RECLINER A MAJORITY OF THE DAY.  HE IS ORIENTED TO SELF.  HE
IS A STAND BY ASSIST TO THE BATHROOM.  HE TOOK A WALK IN THE HALLWAY TODAY AND
STOOD BY THE WINDOW, ENJOYING THE SUN.  HE IS A DNR.  HE IS AWAITING
PLACEMENT.  WILL CONTINUE TO MONITOR AND ASSESS UNTIL NOC SHIFT ARRIVES. (0) independent

## 2022-05-22 NOTE — NUR
SHIFT SUMMARY
 
NO ACUTE CHANGES THIS SHIFT.  THE PT HAS BEEN PLEASANT AND COOPERATIVE ALL
DAY.  HE IS A STAND BY ASSIST WITH A WALKER, HE ENJOYS SITTING IN THE [place] WAY
OR WALKING TO THE WINDOW WITH ASSISTANCE.  HE IS A DNR.  WILL CONTINUE TO
MONITOR AND ASSESS UNTIL NOC SHIFT ARRIVES.

## 2022-05-22 NOTE — NUR
PHONE CALL FROM  PATIENT HAS NOT BEEN SLEEPING VERY MUCH AT NIGHT
SO DR IS INCREASING HIS SEROQUEL FROM 500MG  TO 600MG.  PASSED ON TO DEVEN
NURSING STUDENT FOR HER TO GIVE DURING NOC SHIFT REPORT

## 2022-05-22 NOTE — NUR
SHIFT SUMMARY
82 YR M ADMITTED ON 4/2/22. DNR. NO ACUTE CHANGES THIS SHIFT. PT HAS BEEN HERE
FOR AWHILE AND TENDS TO GET BORED WITH SITTING IN HIS ROOM AND WATCHING TV. HE
LIKES TO TAKE WALKS IN THE HALLWAY AND HAVE BRIEF INTERACTIONS W/ OTHERS. THIS
SHIFT HE SAT IN THE HALLWAY IN A CHAIR FOR A COUPLE OF HOURS IN THE MIDDLE OF
THE NIGHT. HE STATED THAT HE COULDN'T SLEEP. HE HAS BEEN VERY PLEASANT AND
COOPERATIVE AND IS ALSO VERY POLITE.

## 2022-05-23 NOTE — NUR
SHIFT SUMMARY NOC: PT UP TO CHAIR AND BED TWICE DURING SHIFT. PT SLEPT MOST OF
NIGHT. PT DID NOT URINATE DURING SHIFT, BLADDER SCAN 710 ML. ATTEMPTED TO
STRAIGHT CATH WITH 14 FR AND 18 FR, SMALL AMOUNT BLOOD RETURNED BUT NO URINE.
18FR COUDE USED AND WAS ABLE TO  ML MAXINE/YELLOW URINE WITH SMALL BLOOD
CLOTS. PT FELL QUICKLY BACK TO SLEEP.

## 2022-05-23 NOTE — NUR
SHIFT SUMMARY
PT AxOx1- SELF. COOPERATIVE WITH CARE AND/OR REDIRECTABLE WHEN NEEDED THIS
SHIFT. BED ALARM AND CHAIR ALARM ON FOR IMPULSIVITY. PT VOIDED THIS AFTERNOON
WITH CLOUDY URINE THAT WAS MAXINE/TEA COLORED. PT DENIES ANY PAIN WITH
URINATION. BP ALSO ELEVATED.  NOTIFIED OF HYPERTENSIVE TREND. GIVEN ONE TIME
ORDER OF BETA BLOCKER. BP WENT FROM 168/111 DOWN /71 AFTER MED
ADMINISTRATION. PT CURRENTLY SITTING IN CHAIR EATING DINNER, WITH CALL LIGHT
IN REACH. PT DENIES ANY PAIN OR OTHER NEEDS AT THIS TIME.

## 2022-05-24 NOTE — NUR
SHIFT SUMMARY:
 
PATIENT ALERT AND ORIENTED TO SELF. A LITTLE IRRITABLE THROUGHOUT SHIFT, BUT
COOPERATIVE WITH CARE. BED AND CHAIR ALARMS ON FOR PATIENT SAFETY. PT BLADDER
SCANNED, SHOWED >407. PATIENT WAS ABLE TO URINATE 200 ML. PATIENT ABLE TO
AMBULATE AND URINATE WHILE STANDING AT TOILET. PATIENT CURRENTLY RESTING IN
BED WITH CALL LIGHT IN REACH. DENIES ANY PAIN OR NEEDS AT THIS TIME.

## 2022-05-24 NOTE — NUR
PATIENT SUMMARY NOC: PT VOIDED IN URINAL AND TOILET MULTIPLE TIMES THROUGHOUT
NIGHT. PT 1A WITH FWW/GAITBELT TO BATHROOM AND IN HALLWAY. PT IS CONFUSED BUT
REDIRECTABLE. PT ALL NIGHT.

## 2022-05-25 NOTE — NUR
SHIFT SUMMARY;
PT A/O TO SELF, STANDBY ASSISTANCE NEEDED TODAY WITH GB AND WALKER TO
RESTROOM. PT WAS AGITATED AT TIMES BUT WAS ABLE TO CONSOLE AND RE-DIRECT AT
THESE TIMES. USUALLY HE WAS UPSET SURROUNDING DIFFICULTY URINATING BUT ONCE HE
ACCOMPLISHED TASK WAS MUCH MORE PLEASANT. PT IS COOOPERATIVE WITH CARES AND
ADL'S AND MEDICATION COMPLIANCE.

## 2022-05-25 NOTE — NUR
SHIFT SUMMAR NOC: PT SLEPT MOST OF SHIFT. PT ONLY UP TO EAT/DRINK AND USE
RESTROOM. NO ADVERSE EVENTS.

## 2022-05-26 NOTE — NUR
SHIFT SUMMARY
SEE PREVIOUS NOTE. CONFUSION REMAINS UNCHANGED THROUGHOUT THE DAY. HE HAS SAT
IN THE CHAIR AND BED WITH ALARMS ON. HE DOESN'T USE THE CALL LIGHT. HE IS
PRETTY STEADY WITH STAND BY ASSIST AND WALKER INTO THE BATHROOM, ALMOST
CARRYING THE WALKER INTO THE BATHROOM THIS LAST TIME. CALM AFFECT FOR MOST OF
THE DAY. DENIES ANY PAIN OR SOB. BED LOW, CALL LIGHT IN REACH.

## 2022-05-26 NOTE — NUR
AM NOTE
MR ANGEL IS ORIENTATED TO HIS NAME, "[facility]' BUT NOT [facility] OR [place], NOT TO
YEAR OR SITUATION. HE HAS BEEN COOPERATIVE WITH STAFF IN GENERAL, BUT GETTING
UP OUT OF THE CHAIR AND BED WITHOUT CALLING, IMPULSIVE TO GET UP, BUT ALARMS
SET ON BED AND CHAIR AND PT WAS CALM WHEN TOLD RATIONALE FOR STAFF TO BE THERE
WHEN HE GETS UP. BLADDER SCAN POST VOID THIS AM WAS NEGLIGABLE, DOUBLE CHECKED
WITH 2 STAFF AND 2 MACHINES. PT DENIES PAIN, DENIES ANY SOB. BED LOW, CALL
LIGHT IN REACH. Patient mother reports patient having a round/ring on her right side near lower ribs. Patient states its itchy and denies pain. Area has discomfort after itching it.  Mom reports having hx of ringworm and it looks like similar to when she had it. See Care advise given. Mom verbalized understanding  Reason for Disposition  • Ringworm (all triage questions negative)    Protocols used: [id number]-AH

## 2022-05-26 NOTE — NUR
SHIFT SUMMARY: PATIENT IS CONFUSED, URGENCY TO VOID CAUSES PATIENT TO SET OFF
THE BED ALARM AND THIS AGGITATES HIM. BP'S THIS AM WERE TAKEN S/P AMB. TO
BATHROOM.

## 2022-05-27 NOTE — NUR
SHIFT SUMMARY: PATIENT WAS UP AND DOWN DURING THE NIGHT TO URINATE, INC. AT
TIMES BUT CONT. ALSO. GAIT CONTINUES TO BE UNSTEADY AND PATIENT IS IMPULSIVE.
VS TAKE AT 0335 BP AND HEART RATE WERE ELEVATED. PATIENT WAS AGGITATED AT THE
TIME AND HAD JUST BEEN UP TO THE BATHROOM. NO REPORTS OF HEADACHE, VISION
CHANGES OR CHEST PAIN. BED ALARM IS ON FOR SAFETY.

## 2022-05-27 NOTE — NUR
Shift Summary
A/O to self. Quite impulsive throughout day. Moments of being tearful then
agitated and then pleasant/cooperative. Good oral intake. Cooperative with
meds. Medicated with PRN seroquel and zyprexa for agitation. Up to bathroom
with SBA. Camera on verified by monitors calling staff. Frequency of VS
changed to once per shift through VO from Dr. Sheridan.

## 2022-05-28 LAB
ALBUMIN SERPL BCP-MCNC: 3 G/DL (ref 3.4–5)
ALBUMIN/GLOB SERPL: 1 {RATIO} (ref 0.8–1.8)
ALT SERPL W P-5'-P-CCNC: 37 U/L (ref 12–78)
ANION GAP SERPL CALCULATED.4IONS-SCNC: 5 MMOL/L (ref 6–16)
AST SERPL W P-5'-P-CCNC: 14 U/L (ref 12–37)
BASOPHILS # BLD AUTO: 0.02 K/MM3 (ref 0–0.23)
BASOPHILS NFR BLD AUTO: 0 % (ref 0–2)
BILIRUB SERPL-MCNC: 0.6 MG/DL (ref 0.1–1)
BUN SERPL-MCNC: 29 MG/DL (ref 8–24)
CALCIUM SERPL-MCNC: 10.3 MG/DL (ref 8.5–10.1)
CHLORIDE SERPL-SCNC: 109 MMOL/L (ref 98–108)
CO2 SERPL-SCNC: 28 MMOL/L (ref 21–32)
CREAT SERPL-MCNC: 1.39 MG/DL (ref 0.6–1.2)
DEPRECATED RDW RBC AUTO: 49.1 FL (ref 35.1–46.3)
EOSINOPHIL # BLD AUTO: 0.04 K/MM3 (ref 0–0.68)
EOSINOPHIL NFR BLD AUTO: 1 % (ref 0–6)
ERYTHROCYTE [DISTWIDTH] IN BLOOD BY AUTOMATED COUNT: 14.3 % (ref 11.7–14.2)
GLOBULIN SER CALC-MCNC: 3.1 G/DL (ref 2.2–4)
GLUCOSE SERPL-MCNC: 104 MG/DL (ref 70–99)
HCT VFR BLD AUTO: 34.3 % (ref 37–53)
HGB BLD-MCNC: 10.8 G/DL (ref 13.5–17.5)
IMM GRANULOCYTES # BLD AUTO: 0.03 K/MM3 (ref 0–0.1)
IMM GRANULOCYTES NFR BLD AUTO: 0 % (ref 0–1)
LYMPHOCYTES # BLD AUTO: 0.82 K/MM3 (ref 0.84–5.2)
LYMPHOCYTES NFR BLD AUTO: 11 % (ref 21–46)
MCHC RBC AUTO-ENTMCNC: 31.5 G/DL (ref 31.5–36.5)
MCV RBC AUTO: 94 FL (ref 80–100)
MONOCYTES # BLD AUTO: 0.99 K/MM3 (ref 0.16–1.47)
MONOCYTES NFR BLD AUTO: 13 % (ref 4–13)
NEUTROPHILS # BLD AUTO: 5.8 K/MM3 (ref 1.96–9.15)
NEUTROPHILS NFR BLD AUTO: 75 % (ref 41–73)
NRBC # BLD AUTO: 0 K/MM3 (ref 0–0.02)
NRBC BLD AUTO-RTO: 0 /100 WBC (ref 0–0.2)
PLATELET # BLD AUTO: 179 K/MM3 (ref 150–400)
POTASSIUM SERPL-SCNC: 3.9 MMOL/L (ref 3.5–5.5)
PROT SERPL-MCNC: 6.1 G/DL (ref 6.4–8.2)
SODIUM SERPL-SCNC: 142 MMOL/L (ref 136–145)

## 2022-05-28 NOTE — NUR
SHIFT SUMMARY: PATIENT IS VERY UNSTEADY AND SEDATED AT START OF SHIFT. BECAME
AGGITATED WITH PAJAMA PANTS ON. WAS UNABLE TO UNTIE THE PANTS AND DID NOT WANT
HELP. LOW GARDE TEMP .5 WAS MEDICATED WITH TYLENOL WITH GOOD EFFECT.
RECHECKS WERE 99.8 AND 98.6. PATIENT SLEPT WELL THROUGH THE NIGHT. BED ALARM
IS ON FOR SAFETY.

## 2022-05-28 NOTE — NUR
SHIFT SUMMARY
PT HAS BEEN PLEASANT AND COOPERATIVE WITH CARE T/O DAY. PT HAS BEEN UP TO
BATHROOM WITH SUPERVISION AND USE OF FWW. PT IS IMPULSIVE AND FORGETS HIS OWN
LIMITATIONS SO BED ALARM AND CHAIR ALARM IN USE FOR PT SAFETY. PT SAT UP IN
CHAIR MOST OF TODAY WATCHING TV. NO OTHER CHANGES TO REPORT THIS SHIFT.

## 2022-05-29 NOTE — NUR
PT A & O TO SELF. PT AGITATED AND CONFUSED AT TIMES. DNR. MECHANICAL SOFT
DIET. PILLS WHOLE ONE OR 2 AT A TIME. DNR. NO IV ACCESS. INCONTINENT OF URINE
AT TIMES. V/S WNL.1-ASSIST WITH WALKER AND GB. PRN OLANZAPINE GIVEN PER EMAR.
BLADDER SCANNED ONCE PER SHIFT. WILL CONTINUE TO MONITOR.

## 2022-05-30 NOTE — NUR
PT A & O TO SELF. CONFUSED AND EASILY AGITATED. UNCOOPERATIVE WITH CARES AT
TIMES. V/S WNL. NO IV ACCESS. INCONTINENT OF URINE. NO BM THIS SHIFT.
MECHANICAL SOFT DIET. PILLS WHOLE ONE AT A TIME. BLADDER-SCANNED ONCE PER
SHIFT. PRN IM OLANZAPINE GIVEN FOR AGITATION PER EMAR.

## 2022-05-30 NOTE — NUR
SHIFT SUMMARY
 
PATIENT DENIES PAIN, NAUSEA, AND SHORTNESS OF BREATH. PATIENT IS A SBA WITH A
FWW. PATIENT AMBULATED IN THE HALLWAY MULTIPLE TIMES THIS SHIFT WITH
ASSISTANCE. PATIENT VERY PLEASANT AND COOPERATIVE THROUGHOUT SHIFT. PATIENT
JOKING WITH STAFF THROUGHOUT SHIFT. PATIENT A&O TO SELF. PATIENT IS EATING AND
DRINKING WELL. PATIENT IS AWAITING [facility]ORY CARE PLACEMENT. PATIENT IS PLEASANT
AND COOPERATIVE WITH CARE.

## 2022-05-31 NOTE — NUR
ON CAMERA. NOTIFIED PT EATING WHILE HOB LOW. REPOITIONED WITH HOB AROUND 80
DEGREES. NO NOED DISTRESS. CALL LIGHT IN REACH

## 2022-05-31 NOTE — NUR
PT A & TO SELF. CONFUSED. INCREASING AGITATION. PT COMBATIVE, PHYSICAL &
RESISTANT WITH CARES. VEST RESTRAINTS TO UPPER EXTREMITIES IN PLACE.
MECHANICAL SOFT DIET. PILLS WHOLE, 1 AT A TIME. V/S WNL. PRN IM ZYPREXA GIVEN
FOR AGITATION TWICE. UNSTEADY GAIT. 2-ASSIST WITH FWW & GB. NO IV ACCESS.
INCONTINENT OF URINE. NO BM THIS SHIFT. WILL CONTINUE TO MONITOR.

## 2022-06-01 NOTE — NUR
SHIFT SUMMARY-
PT ALERT AND ORIENTED TO SELF, NO ACUTE CHANGE T/O THE SHIFT. PT CONTINUES TO
BE IMPULSIVE, BUT REDIRECTABLE. PT HAS BEEN PLEASENT T/O THE DAY TODAY WILL
CTM. PT CURRENTLY UP IN THE RECLINER WITH CHAIR ALARM SET FOR PT SAFETY EATING
DINNER. PT TAKES MEDS WELL WHOLE IN APPLESAUCE.

## 2022-06-02 NOTE — NUR
PT SET OFF BED ALARM THIS SHIFT SEVERAL TIMES, UNSTEADY OFN FEET, FWW FOR
STABILITY. PT FELL SLEEP IN RECLINER MID SHIFT, INCONTINENT X1 THIS SHIFT.

## 2022-06-02 NOTE — NUR
SHIFT SUMMARY-
PT ALERT AND ORIENTED TO SELF. PT IS VERY IMPULSIVE AND UNSTEADY ON HIS FEET.
PT HAS BEEN INCONTINENT SEVERAL TIMES THIS SHIFT. FREQUENT URINATION. NOTED A
RAISED RASH ON THE PT RIGHT FLANK DR NOTIFIED YESTERDAY, THIS EVENING THE LEFT
FLANK HAS SOME AS WELL. PT DOES NOT SEEM TO BE BOTHERED BY IT AT ALL. PT HAD
HIS DINNER AND IS BACK IN BED, BED ALARM SET FOR PT SAFETY. WILL CTM AND PASS
ON TO NIGHT RN IN REPORT.

## 2022-06-03 NOTE — NUR
PT IS ALERT, PLEASANT THIS SHIFT, CONTINENT/INCONTINENT OF URINE. 1-ASIST WITH
FWW TO RESTROOM. HYDRALAZINE GIVEN X1 FOR HYPERTENTION.

## 2022-06-04 NOTE — NUR
SHIFT SUMMARY
82 YR M ADMITTED ON 4/2/22. DNR. NO ACUTE CHANGES THIS SHIFT. PT GOT UP TO THE
BATHROOM TWICE TONIGHT BUT GETS AGITATED IF HE DOESN'T MAKE IT ON TIME. ASIDE
FROM THAT HE HAS BEEN PLEASANT AND COOPERATIVE. THIS NURSE SPOKE TO HIS WIFE,
GENE, THIS EVENING AND GAVE HER AN UPDATE ON PT. SHE HAS NOT SEEN HIM SINCE
HE WAS ADMITTED AND IS WORRIED THAT IF SHE COMES TO SEE HIM HE WILL GET VERY
ANGRY IF HE CANNOT LEAVE W/ HER. PLAN IS FOR PLACEMENT IN A MEMORY CARE
FACILITY.

## 2022-06-04 NOTE — NUR
SHIFT SUMMARY:
 
NO ACUTE EVENTS. CALM AND COOPERATIVE ALL SHIFT, NO BEHAVIORS REQUIRING PRN
MEDICATIONS. ON ROOM AIR. USING URINAL INDEPENDENTLY, UP TO BR WITH
ASSISTANCE. DENIED PAIN. NAPPED INTERMITTENTLY. AWAITING PLACEMENT.

## 2022-06-05 NOTE — NUR
SHIFT SUMMARY
PT SLEPT MUCH OF THE EVENING. WOKE MULTIPLE TIMES WHEN HE NEEDED TO VOID.
PLEASANTLY CONFUSED. AMBULATED WELL TO THE RESTROOM WITH SBA AND A FWW. PT HAD
AN UNEVENTFUL NIGHT. UPDATE GIVEN TO WIFE. PT AWAITING PLACEMENT.

## 2022-06-05 NOTE — NUR
SPOKE TO PATIENT'S WIFE, GAVE UPDATE ON PT'S CONDITION. SHE ASKED THIS AUTHOR
IF IT WOULD UPSET ELIAN IF THEY HAD A CONVERSATION ON THE PHONE. THIS AUTHOR
RELAYED THIS TO ELIAN, WHO AGREED; HAD TO REINFORCE THAT HE WILL NOT BE
GOING HOME AT THIS TIME. THEY SPOKE ON THE PHONE FOR APPROX 25 MINUTES;
ELIAN WAS TEARFUL BUT REMAINED CALM, NO OUTBURSTS. AFTER CONVERSATION, HE
WENT BTB FOR A NAP.

## 2022-06-05 NOTE — NUR
SHIFT SUMMARY:
 
NO ACUTE EVENTS. CALM AND COOPERATIVE ALL DAY. DENIED PAIN. TOLERATING PO
INTAKE. HAD EMOTIONAL CONVERSATION BY PHONE WITH HIS WIFE; HE STATES THAT SHE
IS PLANNING TO VISIT ON TUESDAY MORNING.

## 2022-06-06 NOTE — NUR
SHIFT SUMMARY
NO ACUTE CHANGES. PT SLEPT WELL THIS EVENING. WAKING OCCASSIONALLY WHEN HAVING
TO VOID. PT DOES CONTINUE TO BE IMPULSIVE AND FORGETS TO CALL BEFORE GETTING
OUT OF BED. BED ALARM ON FOR SAFETY.  PT PLEASANT AND COOPERATIVE. NO
BEHAVIORAL ISSUES. WIFE CALLED AND WAS UPDATED.

## 2022-06-06 NOTE — NUR
PATIENT HAD A MED-LARGE BM. HARD STOOL LARGE CHUNCKS. FIRST BM IN 4 DAYS.
ANOTHER DOSE OF PRN SENNA WAS GIVEN AFTERWARD BECAUSE THE PATIENT WAS
STRAINING RED FACED WHILE TRYING TO HAVE THE BM.

## 2022-06-06 NOTE — NUR
PT INCREASED AGITATION THIS AFT. MED PER EMAR. NO C/O PAIN. IRRITABLE WITH
AIDE THIS AFT. DID OFFER HIM SOME ICECREAM THIS GARY. NO NEW CONCERNS NOTED.
AMBULATING TO BATHROOM SBA. PENDING FACILITY D/C. BED IN LOW POSITION, CALL
LITE IN REACH. BED AND CHAIR ALARM ON FOR SAFETY

## 2022-06-07 LAB
ALBUMIN SERPL BCP-MCNC: 2.7 G/DL (ref 3.4–5)
ALBUMIN/GLOB SERPL: 0.9 {RATIO} (ref 0.8–1.8)
ALT SERPL W P-5'-P-CCNC: 36 U/L (ref 12–78)
ANION GAP SERPL CALCULATED.4IONS-SCNC: 7 MMOL/L (ref 6–16)
AST SERPL W P-5'-P-CCNC: 15 U/L (ref 12–37)
BASOPHILS # BLD AUTO: 0.03 K/MM3 (ref 0–0.23)
BASOPHILS NFR BLD AUTO: 0 % (ref 0–2)
BILIRUB SERPL-MCNC: 0.3 MG/DL (ref 0.1–1)
BUN SERPL-MCNC: 28 MG/DL (ref 8–24)
CALCIUM SERPL-MCNC: 10.5 MG/DL (ref 8.5–10.1)
CHLORIDE SERPL-SCNC: 107 MMOL/L (ref 98–108)
CO2 SERPL-SCNC: 26 MMOL/L (ref 21–32)
CREAT SERPL-MCNC: 1.4 MG/DL (ref 0.6–1.2)
DEPRECATED RDW RBC AUTO: 48.5 FL (ref 35.1–46.3)
EOSINOPHIL # BLD AUTO: 0.09 K/MM3 (ref 0–0.68)
EOSINOPHIL NFR BLD AUTO: 1 % (ref 0–6)
ERYTHROCYTE [DISTWIDTH] IN BLOOD BY AUTOMATED COUNT: 14 % (ref 11.7–14.2)
GLOBULIN SER CALC-MCNC: 3.1 G/DL (ref 2.2–4)
GLUCOSE SERPL-MCNC: 120 MG/DL (ref 70–99)
HCT VFR BLD AUTO: 34.9 % (ref 37–53)
HGB BLD-MCNC: 10.8 G/DL (ref 13.5–17.5)
IMM GRANULOCYTES # BLD AUTO: 0.13 K/MM3 (ref 0–0.1)
IMM GRANULOCYTES NFR BLD AUTO: 2 % (ref 0–1)
LYMPHOCYTES # BLD AUTO: 1.54 K/MM3 (ref 0.84–5.2)
LYMPHOCYTES NFR BLD AUTO: 20 % (ref 21–46)
MCHC RBC AUTO-ENTMCNC: 30.9 G/DL (ref 31.5–36.5)
MCV RBC AUTO: 94 FL (ref 80–100)
MONOCYTES # BLD AUTO: 0.83 K/MM3 (ref 0.16–1.47)
MONOCYTES NFR BLD AUTO: 11 % (ref 4–13)
NEUTROPHILS # BLD AUTO: 5.11 K/MM3 (ref 1.96–9.15)
NEUTROPHILS NFR BLD AUTO: 66 % (ref 41–73)
NRBC # BLD AUTO: 0 K/MM3 (ref 0–0.02)
NRBC BLD AUTO-RTO: 0 /100 WBC (ref 0–0.2)
PLATELET # BLD AUTO: 269 K/MM3 (ref 150–400)
POTASSIUM SERPL-SCNC: 4.5 MMOL/L (ref 3.5–5.5)
PROT SERPL-MCNC: 5.8 G/DL (ref 6.4–8.2)
SODIUM SERPL-SCNC: 140 MMOL/L (ref 136–145)

## 2022-06-07 NOTE — NUR
SHIFT SUMMARY
ADMITTED FOR SI. DNR CODE. PLAN IS FOR PLACEMENT. PT IS CONFUSED AND
IMPULSIVE. HE DOES NOT USE CALL BUTTON OR YELL OUT FOR HELP. BED ALARM/CHAIR
ALARM IN PLACE. HE IS A STANDBY ASSIST W/FWW - BRP. MECHANICAL SOFT DIET. RA.
A&O X2. HX OF MULTIPLE FALLS. AGGRESSION @ TIMES, LABILE MOODS.

## 2022-06-07 NOTE — NUR
PATIENT IS ALERT AND ORIENTED TO SELF AND FAMILY. HIS WIFE VISITED HIM THIS
SHIFT AND THAT WENT REALLY WELL. THE PATIENT HAD ONE EPISODE OF AGITATION THIS
MORNING WITH STAFF BUT NOTHING WHILE THE WIFE WAS HERE OR AFTERWARDS. HE
TOLERATED HER LEAVING REALLY WELL. THE PATIENT SAT UP IN THE RECLINER MOST OF
THE SHIFT. AMBULATES TO THE BATHROOM WITH 1PA. NO NEW CONCERNS. WILL CONTINUE
TO MONITOR

## 2022-06-08 NOTE — NUR
SHIFT SUMMARY
 
PATIENT ALERT AND ORIENTED TO ONLY SELF, PLEASANTLY CONFUSED. PATIENT UP TO
THE RECLINER MOST OF THE DAY. 1 ASSIST WITH GAITBELT AND WALKER
TO THE BATHROOM. PATIENT HAS BEEN PLEASANT TODAY, NO DEFENSIVENESS NOTED. CALL
LIGHT WITHIN REACH.

## 2022-06-08 NOTE — NUR
NIGHT SHIFT SUMMARY
PT HAS SLEPT MOST OF THE SHIFT TONIGHT. BED ALARM ON AS PT DOES NOT CALL FOR
ASSIST WHEN GETTING OUT OF BED. PT IS A 1 ASSIST TO THE BATHROOM. PT HAS BEEN
PLEASANT AND COOPERATIVE TONGHT WITH NO BEHAVIORAL ISSUES THUS FAR. SPOKE WITH
PT'S WIFE GENE WHO CALLED FOR AN UPDATE. TODAY WAS GENE'S FIRST TIME
VISITING THE PT IN OVER A MONTH SHE STATED AND SHE WAS ANXIOUS ABOUT PT'S
BEHAVIOR AFTER SHE WENT HOME EARLIER IN THE DAY. PT STILL AWAITING LONG TERM
PLACEMENT.

## 2022-06-09 NOTE — NUR
SHIFT SUMMARY:
 
PT WAS COOPERATIVE WITH CARE THIS NOC SHIFT. HE IS ABLE TO TAKE ALL MEDS WHOLE
WITH H20. HE DID USE THE FWW TO THE BR, BUT WAS UNABLE TO USE THE CALL LIGHT.
PT TOOK A LONG WHILE IN THE BR AND HAD A VERY SMALL BM (45 MIN). I'M NOT SURE
IF HE USUALLY TAKES THAT AMOUNT OF TIME FOR A BM OR IF HE'S CONSTIPATED.
OTHERWISE NO OTHER CHANGES TO REPORT. BED IS IN THE LOWEST POSITION AND ALARM
IS SET D/T PATIENT NOT USING CALL LIGHT AND BEING A HIGH FALL RISK.

## 2022-06-09 NOTE — NUR
SHIFT SUMMARY
 
PATIENT IS ALERT AND ORIENTED TO SELF, PLEASANT AND COOPERATIVE WITH CARE.
PATIENT IS UP TO THE BATHROOM WITH ONE ASSIST. CONTINENT/INCONINENT. PATIENT
DOES NOT CALL APPROPRIATELY, CONFUSED. NO ACUTE EVENTS. BED ALARM ON, BED IN
LOWEST POSITION.

## 2022-06-10 NOTE — NUR
SHIFT SUMMARY
 
NO ACUTE CHANGES. SPOKE WITH WIFE TO GIVE HER AN UPDATE. PT IS STILL WAITING
FOR PLACEMENT.

## 2022-06-10 NOTE — NUR
DAYSHIFT SUMMARY
No acute changes to patient status, awaiting placement. Vitals stable, pt took
all medications. Wife and daughter at bedside visiting. No behaviors this
shift, pt doing well, no other concerns at this time.

## 2022-06-11 NOTE — NUR
Patient doing well today, took all medications, no behaviors observed.
Continues to get OOB w/o assistance, staff SBA, supervise patient while
ambulating. Patient had wife & daughter visiting today. Vitals stable. No
acute changes to patient status, awaiting placement.

## 2022-06-11 NOTE — NUR
SHIFT SUMMARY
 
PT IS SLEEPING AND HAS HAD NO ACUTE CHANGES THIS SHIFT. WAS UNABLE TO SPEAK
TO WIFE THIS EVENING AS SHIFT WAS VERY BUSY. STILL AWAITING PLACEMENT.

## 2022-06-12 NOTE — NUR
SHIFT SUMMARY:
PT A/O TO SELF, STANDBY ASSIST. PT WAS PLEASANT AND COOPERATIVE WITH CARES. HE
ATE WELL AT MEALS. PT HAD WIFE AND DAUGHTER VISIT AND HE APPEARED TO ENJOY
VISIT AS EVIDENCED BY HIM SMILING AND LAUGHING WITH FAMILY INTERACTION. PT DID
WELL WHEN THEY LEFT AND DID NOT GET UPSET. PT UP IN CHAIR MOST OF THE DAY. HE
CONTINUES TO GET UP TO USE RESTROOM, IS INCONTINENT AT TIMES.

## 2022-06-13 NOTE — NUR
SHIFT SUMMARY
A/O TO SELF AND FAMILY. IMPULSIVE. 1P ASSIST WITH FWW. DENIES PAIN OR SOB.
VSS, NO ACUTE CHANGES AT THIS TIME. BED IN LOWEST POSITION WITH CALL LIGHT IN
REACH. WILL CONTINUE TO MONITOR AND REPORT TO ONCOMING RN.

## 2022-06-13 NOTE — NUR
PT continues with no significant behaviors he is cooperative with medications
& cares. Very forgetful & impulsive. His wife of many years visisted buthe had
already forgotten his Family had visited. Placement is goal in safe supportive
environment. incontinent of urine multiple times he does attempt to use urinal
butspills. He does use the toilet with assist & wears pullups.

## 2022-06-14 LAB
LEUKOCYTE ESTERASE UR QL STRIP: (no result)
PROT UR STRIP-MCNC: (no result) MG/DL
RBC #/AREA URNS HPF: (no result) /HPF (ref 0–2)
SP GR SPEC: 1.02 (ref 1–1.02)
UROBILINOGEN UR STRIP-MCNC: (no result) MG/DL
WBC #/AREA URNS HPF: (no result) /HPF (ref 0–5)

## 2022-06-14 NOTE — NUR
PT continues to be cooperative with meds & cares. He is more incontinent than
continent & needs total care to toilet as he urinates in bed or floor when he
attempts to use urinal or ambulates to the bathroom as he attempts to stand to
urinate & he does not have good control of fine motor to hold or aim stream of
urine. Becomes frustrated with this but continues cooperative with attends
change clothes change. wife is supportive but unable to care for PT due to
dementia & heavy cares related to toileting.

## 2022-06-14 NOTE — NUR
SHIFT SUMMARY-
PT A/OX2, PERSON AND PLACE. PT DENIES ANY COMPLAINTS T/O THE DAY. PT
IMPRULSIVE, SBA TO BATHROOM. LS CLEAR, ON RA. HRR. TRACE BLE. URINE NOTED TO
BE CLOUDY, UA SENT AND STARTED ON ABX. PT CONTINUES TO AWAIT GUARDIANSHIP AND
PLACEMENT. NO OTHER ACUTE CHANGES THIS SHIFT.

## 2022-06-15 NOTE — NUR
SHIFT SUMMARY-
PT COMPLIANT AND PLEASNT TODAY WITH ALL CARE. VSS. PT APPETITE GOOD. PT
RECEIVED PHONE CALL FROM FAMILY TODAY. Yassine PATEL REPS STOPPED BY TO MEET PT.
REPS REPORTED PT WAS PLEASANT AND "CUTE" IN CHARACTER. PT HAD BM. PT
INDEPENDANT IN ROOM. PT RESTING IN BED WITH CALL LIGHT IN REACH.

## 2022-06-15 NOTE — NUR
82 year old Male retired  with dementia continues cooperative with meds
& cares. He has pleasant attitude today & had better success at continence
with using bathroom when he is cued to be seated verus standing to urinate.
He has set off bed alarm multiple times when he gets up to use bathroom. he
uses fww & has fairly steady gait. He has cloudly yellow urine & he had
positive UA C & S pending. Started on antibiotic to treat UTI. Wife Klarissa
updated on antibiotic use. Continues with no agression very cooperative with
meds & cares. placement needed for dementia care.

## 2022-06-16 NOTE — NUR
no acute changes through the night.  pt slept very little, pleasent and
redirectable. updated wife who states she will come to visit today.

## 2022-06-16 NOTE — NUR
NO ACUTE CHANGES AT THIS TIME. PT HAS BEEN APPROPRIATE WITH ALL INTERACTIONS
AND HAS COOPERATED WELL TODAY. PT IS A STANDBY ASSIST WITH WALKER. BED AND
CHAIR ALARM USED. WILL CONTINUE TO MONITORL

## 2022-06-17 NOTE — NUR
SHIFT SUMMARY
PT HAS BEEN A&O TO SELF ONLY, HE HAS BEEN VERY PLEASANT AND COOPERATIVE WITH
CARE AND FOLLOWED DIRECTIONS WELL TODAY. PT UP TO CHAIR FOR MOST OF DAY
WATCHING TV. D/C PLANNING IS PENDING PLACEMENT FOR LT MEMORY CARE. NO OTHER
CHANGES TO REPORT THIS SHIFT

## 2022-06-17 NOTE — NUR
PT WENT FROM CHAIR TO BED SEVERAL TIMES IN THE NIGHT.  PT GIVEN PRUNE JUICE AT
START OF SHIFT TO HELP WITH BM.  PT REPORTS HAVING BM IN THE NIGHT. SPOKE WITH
WIFE ON THE PHONE WHO WAS CONCERNED ABOUT PATIENT GOING TO A FACILITY WITH
LOTS OF PEOPLE. WIFE REPORTS NOT BEING CLEAR ABOUT THE PROCESS AND FEELS
MISGUIDED.  WIFE DOES NOT FEEL COMFORTABLE HAVING THESE CONVERSATIONS IN FRONT
OF PATIENT.

## 2022-06-18 NOTE — NUR
PATIENT DEMANDING CLOTHES TO GET DRESSED SO HE CAN LEAVE. EXPLAINED TO
PATIENT WE HAVE TO STAY HERE OFFERING TO WATCH TV. PATIENT BECAME AGITATED
STORMING OUT OF ROOM. CONVINCED PATIENT TO COME SIT DOWN AND TALK. PATIENT
AGREED AND SAT DOWN BUT STILL DEMANDING CLOTHES TO LEAVE. OFFER HIM TO TAKE
SHOWER INSTEAD. PATIENT STILL AGITATED DEMANDING TO LEAVE. OFFERED SNACK.
PATIENT AGREED TO STAY FOR SOME PUDDING. SEROQUEL MIXED IN PUDDING TO HELP
SETTLE PATIENT.

## 2022-06-18 NOTE — NUR
PT WAS IRRITABLE AT START OF SHIFT, AFTER SPEAKING WITH WIFE ON THE PHONE
PATIENT BRIGHTNED. WIFE TOLD PATIENT SHE DOESNT WANT TO DISTURB HIM DURING THE
DAY BECAUSE THAT IS WHEN HE SLEEPS THE MOST SO PATIENT SLEPT MOST OF THE
NIGHT. WIFE AGREED TO VISIT IN THE MORNING AND BRING COMFORTABLE CLOTHES FOR
MCKAYLA. NO PRNS GIVEN. PT AMBULATING IN ROOM WITHOUT WALKER WITH SOME
UNSTEADINESS.

## 2022-06-18 NOTE — NUR
SHIFT SUMMARY
PATIENT A&O TO SELF AND FAMILY. GREAT IMPROVEMENT IN PATIENTS MOOD WHEN WIFE
VISITS. WIFE CAME FOR A FEW HOURS AND BROUGHT PATIENT SOME CLOTHES. PATIENT
BECAME AGITATED AFTER HIS AFTERNOON NAP TRYING TO LEAVE, ABLE TO GET PATIENT
TO SIT AND EAT SNACK, AND MEDICATED FOR AGITATION. AWAITING PLACEMENT TO
MEMORY CARE FACILITY. REPORT GIVEN TO ONCOMING RN.

## 2022-06-19 NOTE — NUR
NIGHT SHIFT SUMMARY
 
WAS SLEEPING FIRST PART OF SHIFT. WIFE CALLED AND REQUESTED UPDATE, VOICED
APPRECIATION FOR PT CARE. PT WOKE UP LATER, TOLERATED HS MEDS AND WENT BACK TO
SLEEP AFTER MAKING AN ATTEMPT TO GO TO THE BATHROOM WITHOUT SUPERVISION. PT
WAS CONFRONTED ABOUT THIS AND REMINDED TO BE SAFE. EVENTUALLY WENT BACK TO
BED. BED ALARM ON. HAS BEEN RESTING WUIETLY WITH FEW INTERRUPTIONS SINCE. CALL
LIGHT IN REACH

## 2022-06-19 NOTE — NUR
ASSUMED CARE AT 0700. REPORT RECEIVED. CHART REVIEWED. PATIENT IS AWAKE AND UP
IN CHAIR, WITH ALARM.
 
WILL CONT TO MONITOR.

## 2022-06-19 NOTE — NUR
UP TO BATHROOM, VOIDED AND THEN SAT DOWN ON TOILET. SEVERAL MINUTES PASSED,
STAFF ASKED IF HE WSA OK, BECAME ANGRY AND THREATENING TO STAFF TO LEAVE HIM
ALONE. WILL MONITOR.

## 2022-06-19 NOTE — NUR
SHIFT SUMMARY
NO ACUTE CHANGES THIS SHIFT. PATIENT HAS BEEN ALERT, ORIENTED TO HIMSELF AND
FAMILY TODAY. HE HAS BEEN CALM AND COOPERATIVE WITH CARE. HE DOES GET UP TO
USE THE RESTROOM WITH OUT ASSISTANCE. BED AND CHAIR ALARMS IN PLACE. HE
REFUSES ASSISTANCE FROM STAFF. HE HAS VISITORS TODAY AND TWO PHONE CALLS FROM
FAMILY.
 
WILL CONT TO MONITOR UNTIL REPORT GIVEN TO NIGHT SHIFT.

## 2022-06-20 NOTE — NUR
SHIFT SUMMARY
PT SLEEPING, RESTING QUIETLY AT START OF SHIFT. WOKE EASILY FOR CARE. ASSISTED
UP TO CHAIR FOR BREAKFAST. WIFE HERE TO VISIT AFTER BREAKFAST. PT REMAINED IN
CHAIR UNTIL AFTER LUNCH, WHEN WIFE LEFT SO HE COULD TAKE A NAP. PLEASANT AND
CO-OP WITH CARE TO PRESENT THIS SHIFT. TOOK MEDS WHOLE WITH WATER. PT
CONTINUES TO WAIT FOR D/C TO LTC. DENIES FURTHER NEEDS AT THIS TIME. CALL LT
IN REACH.

## 2022-06-20 NOTE — NUR
NIGHT SHIFT SUMMARY
 
AWAKE AND INTERMITENTLY TO AND FROM THE BATHROOM AT SHIFT COMMENCE. DIFFICULT
TO REDIRECT, EASILY ANNOYED AT REDIRECTION. ENCOURAGED TO GO TO BED AT HS, BUT
INSTEAD SAT IN LOUNGE CHAIR AT BEDSIDE AND WENT TO SLEEP FOR A WHILE,
EVENTUALLY WENT TO BED FOR MORE COMFORTABLE SLEEP. BED ALARM ON. CALL LIGHT IN
REACH. WILL CONTINUE TO MONITOR

## 2022-06-21 NOTE — NUR
SHIFT SUMMARY
PT SLEEPING AT START OF SHIFT, UNTIL WOKE FOR BREAKFAST. PT ASSISTED UP TO
CHAIR TO EAT. FOUND INCONTINENT OF URINE. PT NOT WANTING ANY ASSISTANCE IN
CHANGING HIS PULL UPS. PT BECOMING AGITATED AT STAFF WHEN TRYING TO HELP HIM.
PT ABLE TO FEED HIMSELF, TOOK HIS MEDS W/O DIFFICULTY. DR MARIEE HERE TO SEE
PT; UPDATED ON PT STATUS. PRN SEROQUEL GIVEN FOR AGITATION. STAFF FROM [facility] HERE AFTER BREAKFAST, TO INTERVIEW PT. [facility] STAFF TO CALL PT'S
WIFE AND S/W FOR FURTHER INFORMATION. PT RESTING QUIETLY IN CHAIR AT THIS
TIME. CALL LT IN REACH. CHAIR ALARM ON FOR SAFETY.

## 2022-06-21 NOTE — NUR
NIGHT SHIFT SUMMARY
 
WAS SLEEPING AT SHIFT COMMENCE. WAS AWAKENED FOR HS MEDS. A FEW MINUTES LATER
GOT UP AND WENT TO THE BATHROOM, AFFECT ANGRY BUT NO ACTING OUT. WENT BACK TO
BED AFTER SITTING IN THE CHAIR FOR A WHILE. HAS BEEN RESTING QUIETLY WITH EFW
INTERRUTPIONS SINCE. NO NOTED S/S ACUTE DISTRESS AS OF THIS WRITING. CALL
LIGHT IN REACH

## 2022-06-22 NOTE — NUR
PT IS ALERT, ORIENTED TO SELF. THE PT IS UP WITH ASSIST VERY UNSTEADY ON HIS
FEET. THE PT APPEARS TO BE BREATHING EASILY ON RA, THE PT DENIED ANY PAIN. PT
WAS COOPERATIVE, HOWEVER, IS EASILY IRRITATED AT TIMES. CALL LIGHT IN REACH.
BED AND CHAIR ALARMS IN USE.

## 2022-06-22 NOTE — NUR
NIGHT SHIFT SUMMARY
 
HAS BEEN RESTING QUIETLY WITH FEW INTERRUPTIONS SINCE HS, TOLERATED HS MEDS,
WENT TO BATHROOM X 1 AND THEN BACK TO BED. HAS BEEN RESTING QUIETLY WITH FEW
INTERRUTPIONS SINCE. WIFE CALLED FOR UPDATE. CALL LIGHT IN REACH. NO NOTED
ACTING OUT. WILL CONTINUE TO MONITOR. BP ELEVATED BUT ASYMPTOMATIC. WILL ASK
AM NURSE TO F/U WITH MD FOR PRRN ANTIHYPERTENSIVE.

## 2022-06-23 NOTE — NUR
SHIFT SUMMARY
PT COOPERATIVE AND PLEASANT. PT GIVEN SNACKS AND THEN PT WENT TO SLEEP. PT
SLEPT OFF AND ON THROUGHOUT THE NIGHT. NO ACUTE CHANGES TO PT CONDITION. PT
HAS NO COMPLAINTS AT THIS TIME. CALL LIGHT IS WITHIN REACH AND BED ALARM IS
ON.

## 2022-06-23 NOTE — NUR
PT IS ALERT ORIENTED TO SELF AND FAMILY. THE PT IS UP WITH MINIMAL ASSIST
UNSTEADY ON HIS FEET. THE PT USES THE FWW WHEN AMBULATING OUT INTO THE [place].
THE PT HAS BEEN OUT IN THE [place] SEVERAL TIMES TODAY. AT TIMES THE PT IS EASILY
AGITATED, TODAY MORE SO THAN YESERDAY PRN SERAQUIL WAS GIVEN THIS AFTERNOON.
PT APPEARS TO BE BREATHING EASILY ON RA. BED ALARM AND CHAIR ALARM USED. CALL
LIGHT IN REACH. WILL CONTINUE TO MONITOR AND ASSESS FOR CHANGES. AT LUNCH
TODAY THE PT MAY HAVE ASPIRATED SOMEWHAT, HAD A HARSH COUGH, PT SEEMS TO BE
BREATHING EASILY AT THIS TIME.

## 2022-06-24 NOTE — NUR
PT UP TO [facility] WITH SBA WITH CNA AND WALKER.  PT GETTING IRRITATED, REDIRECTED,
REORIENTED TO SURROUNDINGS AND ASSISTED PT BACK TO BED.  ATTEMPTED TO LIFT
PT'S LEGS AND HE STATES "YOU WILL BE SORRY YOU DID THAT."  PLACED LEGS BACK ON
GROUND AND PT WAS ABLE TO GET HIMSELF BACK TO BED.  PT MEDICATED WITH PRN
SEROQUEL PER ORDERS.

## 2022-06-24 NOTE — NUR
SHIFT SUMMARY - NO ACUTE CHANGES THIS SHIFT.  PT DID GET AGITATED X1 WHEN UP
TO BRP - MEDICATED WITH PRN SEROQUEL AND PT WAS ABLE TO GET BACK TO SLEEP.
PT IS AWAITING PLACEMENT.  [facility] HAS BEEN IN TO SEE PT, BUT THEY ARE
REQUESTING GUARDIANSHIP, PER CARE MANAGEMENT NOTES.  I SPOKE TO GENE, PT'S
WIFE EARLIER TONIGHT - UPCATED ON PT STATUS.  PT WAS PLEASANT AND COOPERATIVE
WITH STAFF THROUGHOUT THE NIGHT, UNTIL BRP (STATED ABOVE).  FLUIDS AT BEDSIDE,
CALL LIGHT WITHIN REACH.  BED IN LOW POSITION.  BED ALARM ON FOR PT SAFETY.

## 2022-06-24 NOTE — NUR
SHIFT SUMMARY-
PT ALERT AND ORIENTED TO SELF. PT HAS BEEN SLEEPY T/O THE SHIFT, WAKING FOR
MEALS W/O DIFFICULTY. MEDICATED PRN FOR AGITATION ONCE THIS SHIFT, SEE
PREVIOUS NOTES FOR DETAILS. PT HAS HAD NO ACUTE CHANGES T/O THE DAY, CURRENTLY
IN BED NAPPING, NO S&S OF DISTRESS. WILL CTM AND PASS ON TO NIGHT RN IN
BEDSIDE REPORT.

## 2022-06-24 NOTE — NUR
PT MEICATED PRN FOR AGITATION-
PT GOT UP TO GO TO THE BATHROOM, HE BECOMES AGITATED WITH THE BED AND CHAIR
ALARMS, STAFF WERE ATTEMPTING TO ENSURE PT SAFETY AND STABILITY AS HE
AMBULATED INTO THE BATHROOM, PT STUMBLED A LITTLE AND WHEN STAFF TOUCHED THE
PT TO PREVENT A FALL HE STATED HE DOESN'T "NEED AN AUDIENCE." STAFF ENSURED PT
WAS IN A SAFE LOCATION, HE MADE SEVERAL ANGRY GESTURES AT STAFF, MEDICATED FOR
AGITATION PRN AS THE PT SEEMS TO BE ESCALATING. SPOKE TO HIM AFTER HE WAS BACK
IN HIS RECLINER, PRIOR TO MEDICATING, AND HE STILL APPEARED IRRITABLE, AND
UPSET WITH STAFF CHECKING ON HIM, VERY DIFFERENT BEHAVIOR WHEN COMPARED TO
THIS MORNING.

## 2022-06-24 NOTE — NUR
CALLED DR THURSTON-
PT HAS AN ORDER FOR TID AMOXICILIN. PER DR MARIEE'S NOTE THIS MED WAS SUPPOSED
TO BE GIVEN 6-16 TO 6-23. SPOKE TO DR THURSTON AND RECIEVED ORDER TO DC THIS
MED.

## 2022-06-25 NOTE — NUR
SHIFT SUMMARY
PATIENT ALERT AND ORIENTED TO SELF. HAD NO COMPLAINTS OF PAIN OR SHORTNESS OF
BREATH. NO ACUTE ISSUES NOTED OVERNIGHT. BED IN LOWEST POSITION WITH WHEELS
LOCKED AND ALARM ON. CALL LIGHT WITHIN REACH. REPORT GIVEN TO ONCOMING RN.

## 2022-06-25 NOTE — NUR
PT SPOUSE CALLED FOR AN UPDATE-
PT IS A LITTLE IRRITABLE AND CRANKY TODAY. WIFE WAS PLANNING TO COME SEE THE
PT, HOWEVER SHE IS GOING TO POSTPONE TO PREVENT MAKING HIM MORE AGGITATED. PT
SEEMED AMENABLE TO A SHOWER EARLIER. WILL ATTEMPT A LITTLE LATER.

## 2022-06-25 NOTE — NUR
SHIFT SUMMARY-
PT SAT IN A CHAIR IN THE HALLWAY FOR SEVERAL HOURS, JUST PRIOR TO DINNER HE
WENT BACK INTO HIS ROOM. THE PT DAUGHTER HAD CALLED WANTING TO TALK TO THE PT
AND STAFF WERE ASKED TO CALL HER BACK ONCE THE PT WA BACK IN HIS ROOM, IF IT
SEEMS LIKE TO CALL WILL NOT UPSET HIM. THE PT WAS SO EXCITED TO TALK WITH HER
HE WAS NEARLY GIDDY WITH IT. PT IS CURRENTLY LAUGHING AND TALKING ON THE
PHONE, SOUNDING LIKE HE IS EXTREMELY PLEASED WITH THE CONVERSATION. PT
CURRENTLY SITTING UP IN THE RECLINER TALKING ON THE PHONE AND AWAITING DINNER.
NO ACUTE CHANGE T/O THE DAY. WILL CTM AND PASS ON TO NIGHT RN IN BEDSIDE
REPORT.

## 2022-06-25 NOTE — NUR
NIGHT SHIFT SUMMARY
 
SOMEWHAT CONFUSED A SHIFT COMMENCE, TRIED TO GET OUT OF BED AS HE FELT IT WAS
MORNING AND TIME TO GET UP. NURSE HAD HIM LOOK OUT THE WINDOW AT THE SUNSET,
AND HE REALIZED IT WAS GETTING NIGHT. COOPERATIVE TO GET BACK IN BED.
TOLERATED HS MEDS AND HAS BEEN RESTING QUIETLY WITH FEW INTERRUPTIONS SINCE.
CALL LIGHT IN REACH English

## 2022-06-25 NOTE — NUR
PT HAD A SHOWER EARLIER THIS SHIFT, WITH A NAP FOLLOWING. THE PT SEEMS TO BE
IN A MUCH BETTER MOOD AT THIS TIME. HE GOT UP OUT OF HIS CHAIR, DECLNED THE
BATHROOM AND WALKED OUT INTO THE [place]. PT WALKED ALL THE WAY TO THE END OF THE
ARROYO THEN BACK TO A CHAIR IN THE ARRO[place]O WHERE HE HAS SAT,
FOR THE PAST 30 MINUTES, AND CONTINUES TO SIT. PT HAS DECLINED TO GO BACK TO
HIS ROOM. SPOKE TO HIM ABOUT ELEVATING HIS LEGS (THEY ARE SWOLLEN, DR ROSS)
AND HE SAID "LATER". PLACED A TAB ALARM AS STAFF ARE GETTING PULLED INTO ROOMS
AND CAN NOT WATCH THE PT CONSTANTLY.

## 2022-06-26 NOTE — NUR
PT SPOUSE CAME TO VISIT-
PT WAS VERY PEASENTLY SUPRISED TO SEE HIS SPOUSE, HE REMAINED PLEASENT AND
HAPPY T/O HER VISIT. SPOUSE JUST LEFT, AFTER THE PT DRIFTED OFF TO SLEEP. SHE
STATED THE PT WATCH BAND WAS BROKEN SO SHE TOOK IT WITH HER TO GET IT FIXED.

## 2022-06-26 NOTE — NUR
SHIFT SUMMARY
PATIENT ALERT AND ORIENTED TO SELF. HAD NO COMPLAINTS OF PAIN OR SHORTNESS OF
BREATH. NO ACUTE ISSUES NOTED OVERNIGHT. CALL LIGHT WITHIN  REACH. REPORT
GIVEN TO ONCOMING RN.

## 2022-06-26 NOTE — NUR
SHIFT SUMMARY-
PT HAD A VISIT FROM HIS WIFE AGUSTIN. BEHAVIOR HAS BEEN POSITIVE TODAY, WITH A
LITTLE GRUMPY EVERY NOW AND THEN. THE PTWAS GREATLY PLEASED WITH HIS VISIT.
WIFE LEFT WHILE THE PT WAS SLEEPING. WHEN HE WOKE HE WAS CRANKY BUT DIDNT SEEM
TO BE UPSET ABOUT HIS WIFE LEAVING. PT SEMED TO HAVE FORGOTEN SHE WAS THERE.
PT BECAME MORE PLEASENT AFTER HE WENT TO THE BATHROOM (THIS IS ONE OF HIS
TRIGGERS, HE IS PRIVATE AND DOES NOT LIKE BEING TOUCHED). PT TOLLERATED WELL.
HE WILL NEED A SHOWER AGAIN TOMORROW, SHOWERS SEEM TO GREATLY IMPROVE HIS
MOOD. PT CURRENTLY UP IN THE RECLINER, CHAIR ALARM IN PLACE CALL LIGHT IN
REACH, PT DOES NOT CALL. NO ACUTE CHANGES T/O THE SHIFT. WILL CTM AND PASS ON
TO NIGHT RN IN BEDSIDE REPORT.

## 2022-06-27 NOTE — NUR
DAY SHIFT SUMMARY
 
82 YR OLD MALE PT WAITING FOR PLACEMENT. FALL RISK, 1 ASSIST WITH WALKER TO
BATHROOM. A/O TO FAMILY AND SELF. ON RA, NO IV, MEDS IN APPLESAUCE, NO ACUTE
CHANGES THIS SHIFT.

## 2022-06-28 LAB
ALBUMIN SERPL BCP-MCNC: 3.2 G/DL (ref 3.4–5)
ALBUMIN/GLOB SERPL: 1 {RATIO} (ref 0.8–1.8)
ALT SERPL W P-5'-P-CCNC: 51 U/L (ref 12–78)
ANION GAP SERPL CALCULATED.4IONS-SCNC: 4 MMOL/L (ref 6–16)
AST SERPL W P-5'-P-CCNC: 21 U/L (ref 12–37)
BASOPHILS # BLD AUTO: 0.02 K/MM3 (ref 0–0.23)
BASOPHILS NFR BLD AUTO: 0 % (ref 0–2)
BILIRUB SERPL-MCNC: 0.2 MG/DL (ref 0.1–1)
BUN SERPL-MCNC: 26 MG/DL (ref 8–24)
CALCIUM SERPL-MCNC: 10.6 MG/DL (ref 8.5–10.1)
CHLORIDE SERPL-SCNC: 109 MMOL/L (ref 98–108)
CO2 SERPL-SCNC: 30 MMOL/L (ref 21–32)
CREAT SERPL-MCNC: 1.12 MG/DL (ref 0.6–1.2)
DEPRECATED RDW RBC AUTO: 48.8 FL (ref 35.1–46.3)
EOSINOPHIL # BLD AUTO: 0.15 K/MM3 (ref 0–0.68)
EOSINOPHIL NFR BLD AUTO: 3 % (ref 0–6)
ERYTHROCYTE [DISTWIDTH] IN BLOOD BY AUTOMATED COUNT: 14.6 % (ref 11.7–14.2)
GLOBULIN SER CALC-MCNC: 3.2 G/DL (ref 2.2–4)
GLUCOSE SERPL-MCNC: 107 MG/DL (ref 70–99)
HCT VFR BLD AUTO: 37.2 % (ref 37–53)
HGB BLD-MCNC: 11.9 G/DL (ref 13.5–17.5)
IMM GRANULOCYTES # BLD AUTO: 0.03 K/MM3 (ref 0–0.1)
IMM GRANULOCYTES NFR BLD AUTO: 1 % (ref 0–1)
LYMPHOCYTES # BLD AUTO: 1.16 K/MM3 (ref 0.84–5.2)
LYMPHOCYTES NFR BLD AUTO: 25 % (ref 21–46)
MAGNESIUM SERPL-MCNC: 2.3 MG/DL (ref 1.6–2.4)
MCHC RBC AUTO-ENTMCNC: 32 G/DL (ref 31.5–36.5)
MCV RBC AUTO: 92 FL (ref 80–100)
MONOCYTES # BLD AUTO: 0.68 K/MM3 (ref 0.16–1.47)
MONOCYTES NFR BLD AUTO: 15 % (ref 4–13)
NEUTROPHILS # BLD AUTO: 2.62 K/MM3 (ref 1.96–9.15)
NEUTROPHILS NFR BLD AUTO: 56 % (ref 41–73)
NRBC # BLD AUTO: 0 K/MM3 (ref 0–0.02)
NRBC BLD AUTO-RTO: 0 /100 WBC (ref 0–0.2)
PHOSPHATE SERPL-MCNC: 3 MG/DL (ref 2.5–4.9)
PLATELET # BLD AUTO: 219 K/MM3 (ref 150–400)
POTASSIUM SERPL-SCNC: 4.3 MMOL/L (ref 3.5–5.5)
PROT SERPL-MCNC: 6.4 G/DL (ref 6.4–8.2)
SODIUM SERPL-SCNC: 143 MMOL/L (ref 136–145)

## 2022-06-28 NOTE — NUR
DAY SHIFT SUMMARY
 
82 YR OLD MALE A/O TO SELF AND FAMILY, EDEMA TO ANKLES, 1 PERSONA ASSIST WITH
WALKER, CONTINENT/INCONTINENT, WAITING FOR PLACEMENT, CALL LIGHT IN REACH,
FAMILY AT BEDSIDE FOR VISIT ON THIS SHIFT. NO ACUTE CHANGES THIS SHIFT

## 2022-06-28 NOTE — NUR
SHIFT SUMMARY
A/O TO SELF AND FAMILY. 1P ASSIST WITH FWW. DENIES PAIN OR SOB. PLEASANT AND
COOPERATIVE THIS SHIFT. SLEPT T/O THE NIGHT. VSS, NO ACUTE CHANGES AT THIS
TIME. BED IN LOWEST POSITION WITH CALL LIGHT IN REACH. WILL CONTINUE TO
MONITOR AND REPORT TO ONCOMING RN.

## 2022-06-29 NOTE — NUR
SUMMARY- PT ALERT TO SELF AND FAMILY. VERY FORGETFUL, BUT SEEMS TO KNOW HIS
PHYSICAL LIMITS, DID NOT SET OFF BED OR CHAIR ALARM. SAT UP IN CHAIR FOR
MEALS. FEEDS SELF WITH SET UP. AWAITING PLACEMENT TO MEMORY CARE.

## 2022-06-29 NOTE — NUR
SHIFT SUMMARY
ADMITTED FOR SI - RESOLVED. DNR CODE. PLAN IS FOR MEMORY CARE PLACEMENT. [facility]
SOFT/GRND MEAT DIET. A&O X2. CONTINENT/INCONTINENT. 1 ASSIST W/FWW & GB- BRP.
ON RA. RX WHOLE W/APPLESAUCE. IMPULSIVE. BLE EDEMA.

## 2022-06-30 NOTE — NUR
SUMMARY- PT ALERT TO SELF, WITHDRAWN. FORGETFUL, FREQ SET OFF CHAIR ALARM
GETTING UP WITHOUT ASSIST DESPITE REMINDERS. SBA TO BATHROOM, GOOD STRENGTH,
MILDLY UNSTEADY. HAD LG BM THIS AFTERNOON AFTER LAXATIVE THIS AM. TOLERATING
FOOD AND FLUIDS. AWAITING PLACEMENT TO MEMORY CARE FACILITY. WIFE IN TO SEE PT
FOR A FEW HOURS THIS AFTERNOON. PT HAS A FEW TIMES OF OUTBURST WHEN STAFF
ATTEMPTED TO HELP GET HIS ATTENDS OFF IN THE BATHROOM, STATING " I DON'T NEED
YOUR G.D HELP". ALSO ANGRY AT NOT GETTING SOMETHING OPEN. OTHERWISE HE IS
DIRECTABLE AND COOPERATIVE.

## 2022-06-30 NOTE — NUR
SHIFT SUMMARY: PATIENT HAS SLEPT MUCH OF THE SHIFT. DID WAKE UP FOR A SNACK
AND WATCH TV FOR A WHILE. DOES NOT USE CALL BELL FOR ASSIST TO THE BATHROOM.
BED ALARM IS ON. SPOKE WITH ROD, PATIENTS WIFE, AND GAVE HER AN UPDATE. BED
ALARM IS ON FOR SAFETY.

## 2022-07-01 LAB
ANION GAP SERPL CALCULATED.4IONS-SCNC: 5 MMOL/L (ref 6–16)
BASOPHILS # BLD AUTO: 0.03 K/MM3 (ref 0–0.23)
BASOPHILS NFR BLD AUTO: 1 % (ref 0–2)
BUN SERPL-MCNC: 32 MG/DL (ref 8–24)
CALCIUM SERPL-MCNC: 10.4 MG/DL (ref 8.5–10.1)
CHLORIDE SERPL-SCNC: 109 MMOL/L (ref 98–108)
CO2 SERPL-SCNC: 28 MMOL/L (ref 21–32)
CREAT SERPL-MCNC: 1.29 MG/DL (ref 0.6–1.2)
DEPRECATED RDW RBC AUTO: 50.4 FL (ref 35.1–46.3)
EOSINOPHIL # BLD AUTO: 0.14 K/MM3 (ref 0–0.68)
EOSINOPHIL NFR BLD AUTO: 3 % (ref 0–6)
ERYTHROCYTE [DISTWIDTH] IN BLOOD BY AUTOMATED COUNT: 14.8 % (ref 11.7–14.2)
GLUCOSE SERPL-MCNC: 97 MG/DL (ref 70–99)
HCT VFR BLD AUTO: 34.4 % (ref 37–53)
HGB BLD-MCNC: 10.9 G/DL (ref 13.5–17.5)
IMM GRANULOCYTES # BLD AUTO: 0.03 K/MM3 (ref 0–0.1)
IMM GRANULOCYTES NFR BLD AUTO: 1 % (ref 0–1)
LYMPHOCYTES # BLD AUTO: 1.29 K/MM3 (ref 0.84–5.2)
LYMPHOCYTES NFR BLD AUTO: 25 % (ref 21–46)
MCHC RBC AUTO-ENTMCNC: 31.7 G/DL (ref 31.5–36.5)
MCV RBC AUTO: 92 FL (ref 80–100)
MONOCYTES # BLD AUTO: 0.81 K/MM3 (ref 0.16–1.47)
MONOCYTES NFR BLD AUTO: 16 % (ref 4–13)
NEUTROPHILS # BLD AUTO: 2.78 K/MM3 (ref 1.96–9.15)
NEUTROPHILS NFR BLD AUTO: 55 % (ref 41–73)
NRBC # BLD AUTO: 0 K/MM3 (ref 0–0.02)
NRBC BLD AUTO-RTO: 0 /100 WBC (ref 0–0.2)
PLATELET # BLD AUTO: 187 K/MM3 (ref 150–400)
POTASSIUM SERPL-SCNC: 4.5 MMOL/L (ref 3.5–5.5)
SODIUM SERPL-SCNC: 142 MMOL/L (ref 136–145)

## 2022-07-01 NOTE — NUR
SHIFT SUMMARY: PATIENT HAD GOOD EFFECT FROM SEROQUEL AND HAS BEEN SLEEPING
SINCE. PATIENT IS INC. OF URINE AT TIMES. BED ALARM IS ON FOR SAFETY. GOOD
APPETITE. % OF SNACK AND TOLERATED WELL.

## 2022-07-01 NOTE — NUR
AGGITATION: PATIENT IS RESTLESS AND UP IN THE CHAIR. WRITER WAS CALLED BY
VIDEO MONITOR TO REPORT PATIENT WAS ATTEMPTING TO GET OUT OF THE CHAIR.
PATIENT DID NOT WANT WRITER TO TOUCH ANYTHING ON HIS TRAY AS WRITER ATTEMPTED
TO MOVE BEDSIDE TRAY CLOSER TO PATIENT. PATIENT IS IRRITATED AND STATED.
"DON'T TOUCH ANYTHING IN THERE". PATIENT PROCEEDED TO PUSH BEDSIDE TABLE BACK
AND FORTH. PRN SEROQUEL WAS GIVEN. PATIENT WAS THEN ASSISTED WITH AMBULAING IN
THE ARROYO AND THEN BACK TO BED. BED ALARM IS ON.

## 2022-07-01 NOTE — NUR
PT COOP THIS AM. NOT TALKATIVE. ALERT TO SELF, OCC DISAGREEABLE AND AGRESSIVE.
H/R IS REG, NO MURMUR NOTED. NO TELE. LUNGS ARE CLEAR, RESP EASY, UNLABORED.
ON R.A.  BT X4, LAST BM YEST PER PT.  VOIDS PER URINAL. HE IS A SBA WITH FWW.
EDEMA BLE IS +2. NNO OTHER CONCERNS NOTED. PENDING TRANSFER TO SNF AND
GUARDIANSHIP. BED IN LOW POSITION, CALL LITE IN REACH. BED ALARM ON FOR SAFETY

## 2022-07-01 NOTE — NUR
PT MOSTLY PLEASANT TODAY. STILL HAS SOME EDEMA +2 BLE. WIFE IN TO SEE TODAY.
CONTINUED TO BE PLEASANT TODAY. NO NEW ISSUES NOTED TODAY. EXPECT PLACEMENT
WHEN AVAIL. BED IN LOW POSITION, CALL LITE IN REACH BED ALARN ONFOR SAFETY

## 2022-07-02 NOTE — NUR
PT QUIET THIS AM. LET HIM SLEEP LONGER THIS AM. HE WILL ANSWER SOME QUESTIONS,
BUT IGNORES MOST. COOP WITH CARE. ALERT TO SELF, . H/R REG, NO MURMUR
NOTED. NO TELE. +2 EDEMA BLE.  LUNGS CLEAR, RESP EASY, UNLABORED. FEEDING SELF
AT BEDSIDE.  BT X4 NOT ANSWERING LAST BM. VOIDS SBA WITH FWW TO BATHROOM. BED
IN LOW POSITION, CALL LITE IN REACH, BED ALARM ON FOR SAFETY

## 2022-07-02 NOTE — NUR
SHIFT SUMMARY
82 YR M ADMITTED SINCE 4/2/22. DNR. NO ACUTE CHANGES THIS SHIFT. ELIAN IS
DOING WELL AND HAS BEEN PLEASANT AND COOPERATIVE. I SAT DOWN WITH HIM THIS
SHIFT AND WENT THROUGH HIS SMALL PHOTO ALBUM WITH HIM. HE WAS ABLE TO POINT
OUT SOME OF THE PEOPLE IN THE PICTURES BUT WAS CONFUSED BY OTHERS EVEN THOUGH
THEY ARE ALL VERY CLOSE FAMILY MEMBERS. I SPOKE W/ HIS WIFE, GENE AND GAVE
HER AN UPDATE. SHE ADVISED ME THAT ONE OF THE PICS IN THE ALBUM WAS FROM THEIR
63TH [employer]ARY.

## 2022-07-02 NOTE — NUR
PT DOING PRETTY WELL TODAY. NO C/O PAIN. HIS WIFE DID COME TO VISIT FOR SOME
TIME THIS AFTERNOON. HE DID NOT ANSWER MUCH QUESTIONS TODAY, BUT WIFE SAID HE
SEEMED BETTER TODAY. PT EATING WELL. TAKING MEDS WELL. STILL PENDING
PLACEMENT. BED IN LOW POSITION, CALL LITE IN REACH, BED ALARM ON FOR SAAFETY

## 2022-07-03 LAB
ALBUMIN SERPL BCP-MCNC: 2.8 G/DL (ref 3.4–5)
ANION GAP SERPL CALCULATED.4IONS-SCNC: 6 MMOL/L (ref 6–16)
BUN SERPL-MCNC: 33 MG/DL (ref 8–24)
CALCIUM SERPL-MCNC: 10.4 MG/DL (ref 8.5–10.1)
CHLORIDE SERPL-SCNC: 108 MMOL/L (ref 98–108)
CO2 SERPL-SCNC: 28 MMOL/L (ref 21–32)
CREAT SERPL-MCNC: 1.28 MG/DL (ref 0.6–1.2)
GLUCOSE SERPL-MCNC: 106 MG/DL (ref 70–99)
PHOSPHATE SERPL-MCNC: 3.2 MG/DL (ref 2.5–4.9)
POTASSIUM SERPL-SCNC: 4.4 MMOL/L (ref 3.5–5.5)
SODIUM SERPL-SCNC: 142 MMOL/L (ref 136–145)

## 2022-07-03 NOTE — NUR
SHIFT SUMMARY:
 
PLEASANT AND COOPERATIVE T/O SHIFT. SAT UP IN CHAIR OUT IN ARROYO. CHAIR ALARM
NEEDED DUE TO PT BEING IMPULSIVE AND NOT CALLING FOR HELP. WIFE CAME TO VISIT
FOR A FEW HOURS THIS SHIFT. NO ACUTE NEEDS OR CONCERNS. CNA ASSISTING TO
COMMODE AT THIS TIME.

## 2022-07-03 NOTE — NUR
SHIFT SUMMARY
82 YR M ADMITTED SINCE 4/2/22. DNR. NO ACUTE CHANGES THIS SHIFT. ELIAN HAS
SLEPT FOR MOST OF THIS SHIFT BUT HAS BEEN PLEASANT AND COOPERATIVE WHEN AWAKE.
HE IS GETTING UP TO THE BATHROOM ON HIS OWN BUT TENDS TO GET FRUSTRATED WHEN
THE BED ALARM GOES OFF. HE DOES NOT WANT TOO
MUCH HELP AND PREFERS TO DO THINGS
HIMSELF. SPOKE WITH HIS WIFE GENE, WHO CALLS NIGHTLY TO CHECK ON HIM.

## 2022-07-04 NOTE — NUR
PT AOX2 AND COOPERATIVE OF CARE. NO ACUTE CHANGES AT THIS TIME. PT IS AS
STANDBY ASSIST WITH WALKER. PT HAS BED AND CHAIR ALARM USED AS HE DOES NOT
CALL WITH CALL LIGHT. PT HAS BEEN COOPERATIVE OF CARE TO DAY. CHAIR ALARM IN
PLACE WILL CONTINUE TO MONITOR.

## 2022-07-05 NOTE — NUR
PT AGGITATED WANTING TO SPEAK WITH WIFE. GENE CALLED, PT YELLED AT WIFE ON
THE PHONE AND HUNG UP ON HER. PT THEN BEGAN PACING THE HALLS LOOKING FOR HIS
TRUCK. PT WAS ABLE TO BE REDIRECTED BACK IN BED AND GIVEN NIGHT MEDS. AT 2030
PT WAS UP PACING THE ARROYO AND THREW HIS WALKER AT STAFF. SECURITY CALLED AND
IM ZYPREXA WAS GIVEN.

## 2022-07-05 NOTE — NUR
Shift has been mostly unremarkable. Client is easily agitated and is irritated
when anyone attempts to assist him with ambulation. One incident where client
prepared to strike out when assisted with ambulation to toilet. Otherwise
cooperative with direction. Call light left within reach.

## 2022-07-05 NOTE — NUR
PT SLEPT THROUGH THE NIGHT WITH NO CHANGE IN STATUS. WIFE CALLED AND WAS UPSET
PT WAS IN SOILED CLOTHES WHEN SHE ARRIVED YESTERDAY.  NURSE AND WIFE HAD A
CONVERSATION ABOUT BRINGING CLOTHES FOR PATIENT TO WEAR AND CHANGE INTO TO BE
MORE COMFORTABLE. PT DOES NOT LIKE WEARING HOSPITAL GOWN. WIFE EXPRESSED FEAR
THAT CLOTHES WILL GET LOST. ENCOURAGED THE USE OF A HAMPER FOR WIFE TO TAKE
CLOTHES HOME TO BE WASHED.

## 2022-07-06 NOTE — NUR
SHIFT SUMMARY
PT A&O X2-3, MOOD UP AND DOWN T/O SHIFT. PT TOLERATING PO INTAKE, HTN
MEDICATED PER EMAR. PT DAUGHTER IN TO SEE PT- SERVED PT W/ [employer]
PAPERS. VSS. CALL LIGHT W/IN REACH. SEYMOUR PEDROZA DC'ED THIS SHIFT, NO ABUSIVE
BEHAVIOR NOTED THIS SHIFT. CHAIR ALARM IN PLACE.

## 2022-07-06 NOTE — NUR
END OF SHIFT SUMMARY
PT CONTINUES TO REMAIN IN RESTRAINT VEST. WHEN ATTEMPTING ANY CARE, PT IS
THREATENING STAFF STATING "IM GOING TO BEAT YOU IF YOU TOUCH ME" PT THROWS HIS
LEGS OVER THE SIDE OF THE BED AND YELLS AT STAFF, UNABLE TO REDIRECT
0330 PT WAS GIVEN PRN SERQUIL AND AGREEABLE TO CHANGING HIS SHEETS. PT FINALLY
FELL ASLEEP AROUND 0400

## 2022-07-07 NOTE — NUR
AT 2335, PROVIDER NOTIFIED OF PT'S AGITATION. WRITER ALSO TOLD DR. PEREIRA
ZYPREXA DOES NOT HAVE A THERAPEUTIC EFFECT ON PATIENT. PROVIDER ORDERED, "
GIVE HALDOL 2.5 IM ONE-TIME ONLY AND DISCONTINUE ZYPREXA." ORDER ENTERED AS
ORDERED AND ZYPREXA D/C'ED.
AT 0020, PT RESTING COMFORTABLY IN BED. NO S/S OF ANY DISTRESS. WILL CONTINUE
TO MONITOR.

## 2022-07-07 NOTE — NUR
COURT IN TO SEE PT REGUARDING GUARDIANSHIP. PT IN CHAIR EATING BUTTER, REFUSES
TO GIVE TO STAFF BECOMES AGGITATED WHEN THIS RN ATTEMPTS TO REMOVE BUTTER AND
OFFER YOGURT AS REPLACEMENT. PT ABLE TO ANSWER REPRESENITIVE APPROPRIATLY.

## 2022-07-07 NOTE — NUR
SHIFT SUMMARY
PT A&O X2-3 AND IN PLEASENT MOOD T/O SHIFT. PT UP AND DOWN, AMBULATED IN
HALLS. 1 BRIEF EPPISODE OF AGGITATION IN AM, SEE NOTE. EARLY MORNING
HALLUCINATION NOTED, PT APPEARS TO BE SEWING AIR NEAR LEGS. DID NOT PROCEDE
T/O SHIFT. CALL LIGHT W/IN REACH. VSS. WIFE CALLED FOR UPDATE, DAUGHTER ON PT
ROOM PHONE NOW. AWAITING PLACEMENT @ THIS TIME.

## 2022-07-07 NOTE — NUR
Had at length discussion with patient---if no improvement with P&I, recommend 3 snip OS. If tearing improves OS s/p 3 snip, recommend 3 snip OD. If tearing does not improve much after that, recommend RLL canthoplasty with JPF. SHIFT SUMMARY
PT A&O X2, MOOD UP AND DOWN T/O SHIFT. PT BREIFLY BECAME AGGITATED WHEN TRYING
TO AMBULATE INDEPENDENTLY IN ROOM, EASILY REDIRECTABLE. ENJOYED RELAXING IN
CHAIR AND WATCHING MELITON FORD. HTN MEDICATED PER EMAR. CALL LIGHT W/IN
REACH.

## 2022-07-07 NOTE — NUR
A & O X3. CONFUSED, AGITATED AT TIMES. V/S WNL. NO IV ACCESS. MECHANICAL SOFT
DIET. PILLS WHOLE WITH WATER. BED ALARM ON/VIDEO AT ALL TIMES. PRN PO SEROQUEL
& HALDOL IM GIVEN FOR AGITATION. PT VOIDS W/O DIFFICULTY. NO BM THIS SHIFT.
INCONTINENT OF URINE AT TIMES. BRIEFS IN PLACE. HALDOL RESOLVED AGITATION.
WILL CONTINUE TO MONITOR.

## 2022-07-08 NOTE — NUR
Shift Summary
A/Ox2 to self and place ("hospital"). Remains impulsive with unsteady gait.
Patient has been quite pleasant. Continent of bowel/bladder with large bm
today. Denies pain. Wife updated this morning. No acute changes. Awaiting
placement.

## 2022-07-08 NOTE — NUR
A&O TO SELF/FAMILY. AGITATION IMPROVED. PT PREVIOUSLY MEDICATED WITH
ATIVAN/BENADRYL DURING DAYTIME. V/S WNL. 1-ASSIST WITH FWW.
CONTINENT/INCONTINENT OF URINE. BRIEFS IN PLACE. PT REFUSED TO BE CHANGED. NO
IV ACCESS. MECHANICAL SOFT DIET. PILLS WHOLE WITH WATER. WILL CONTINUE TO
MONITOR,.

## 2022-07-09 NOTE — NUR
A&JOHN SELF/FAMILY. CONFUSED. LESS AGITATED THE LAST 2 NIGHTS. NO PRN MEDS
REQUIRED. V/S WNL. A SCRUB-DOWN GIVEN. NEW GARMETS APPLIED. INCONTINENT OF
URINE/BOWEL AT TIMES. 2 SOFT LARGE BM'S THIS SHIFT. NO IV ACESS. WILL CONTINUE
TO MONITOR.

## 2022-07-09 NOTE — NUR
SHIFT SUMMARY
 
PT A&O X 2. VSS. PT HAS BEEN RESTING QUIETLY MOST OF THE DAY, PT SLEPT IN
THIS MORNING TIL ABOUT 10:00. HAS BEEN COOPERATIVE WITH CARE TODAY. DID
ATTEMPT TO GET UP ON HIS OWN TO USE THE RESTROOM. RE-ORIENTED & RE-DIRECTED TO
SAFETY & FALL PREVENTION. COOPERATED WITH CNA TODAY FOR A SHOWER.
PLAN IS FOR PLACEMENT TO SNF/REHAB/MEMORY CARE UPON DC.

## 2022-07-10 NOTE — NUR
PT up in bedside recliner with remote camera monitoring & call on AMS from
camera tech. PT with recent increased use of scheduled dose of seroquel & use
of PRN increased. to ct for head ct

## 2022-07-10 NOTE — NUR
A&JOHN SELF/FAMILY. PT'S AGITATION ON THE RISE AND HARD TO REDIRECT. PT
VERBALLY THREATENING STAFF. REFUSES ASSISTANCE/CARES AT TIMES. INCONTINENT OF
URINEX3. NO BM. 1-2 ASSIST WITH FWW. PT REFUSES GB. PRN HALDOLX2 FOR AGITATION
AND BENADRYLX1 FOR AGITATION PER EMAR. NO BM THIS SHIFT. BED ALARM ON/ ON
VIDEO AT ALL TIMES. VS WNL. WILL CONTINUE TO MONITOR.

## 2022-07-10 NOTE — NUR
SHIFT SUMMARY
 
A&O TO SELF/FAMILY. VSS. DNR.  NOTED THAT PT'S AGITATION WAS INCREASED TODAY.
(SEE EMAR) MEDICATED PER MD ORDERS WITH HALDOL, BENADRYL, SEROQUEL AND ATIVAN
WITH MINIMAL SHORT TERM EFFECT. IV/IM ATIVAN IS UNAVAILABLE D/T A NATIONAL
[place], THIS AFTERNOON PLACED CALL TO MD TO INFORM. ORDERS RECEIVED FOR PO
ATIVAN. PT IS DIFFICULT TO REDIRECT & CAN BECOME ANGRY & AGITATED. WILL GET UP
OUT OF BED AND THE CHAIR TO USE THE RESTROOM WITHOUT CALLING FOR HELP. IS
WOBBLY & UNSTEADY ON HIS FEET. WILL BEGIN TO TOPPLE WITHOUT ASSIST. BED/CHAIR
ALARMS AND CAMERA ON AT ALL TIMES. ATTEMPTS TO USE RESTROOM IN ORDER TO NOT BE
INCONTINENT HOWEVER DOES DRIBBLE URINE AND HAS DIFFICULTY WITH BM'S AND SELF
CLEAN UP. HAS BM TODAY. IS 1-2 MOD ASSIST FOR ALL AMBULATION, REFUSES WALKER &
GB. WIFE WAS IN TO SEE HIM THIS AFTERNOON AND WAS UPDATED. SHE HAD CONCERNS
REGARDING HIS R SHOULDER AND HIS R ABD AREA THAT SHE STATES "HE HAD THE MESH
HERNIA SURGERY ON". EVENTUAL PLAN IS FOR SNF/REHAB/MEMORY CARE PLACEMENT.

## 2022-07-11 NOTE — NUR
PT has dementia with aggitated features & he had fall yesterday after he had
been medicated with prn rx & had recent rx change with seroquel increased to
700 mg po at hs on 07/06/22. He has been  64 years to [place] & she has
been more supportive and she tries to visit PT frequently. She called for
update last night & she expressed concern over increased antipsychotic use
recent & decreased oral intake and increased unsteady gait. Head CT done after
PT had fall in bathroom yesterday evening. No acute changes & PT was alert at
HS but confused & trying to eat a silicone stress reliever but did accept a
ensure & was fed yogurt & applesauce. PT cooperative with cares & medications
orally.

## 2022-07-11 NOTE — NUR
END OF SHIFT SUMMARY:
PATIENT VERY DROWSINESS THIS MORNING (SEE NURSE'S NOTE). BY MID MORNING,
PATIENT ALERT AND CALMLY INTERACTING WITH STAFF. AT TIMES, PATIENT APPEARED
IRRITATED, BUT ALLOWED STAFF TO PROVIDE CARE AND ASSIST WITH ADLS.
PATIENT UP TO CHAIR FOR MEALS. PATIENT HAD A GOOD APPETITE. PATIENT CALM AND
COOPERATIVE THROUGHOUT THE SHIFT.
PATIENT'S WIFE REPORTED THAT SHE WILL BE HERE TOMORROW MORNING AT 10AM TO MEET
WITH DR. WILKS.

## 2022-07-11 NOTE — NUR
MORNING ASSESSMENT:
PATIENT OPENS EYES TO NAME SPOKEN LOUDLY, THEN FALLS BACK ASLEEP. PATIENT
ATTEMPTING TO SIT UP IN BED, REPORTED HE NEEDED TO GO PEE. ASSISTED WITH
URINAL. PATIENT ABLE TO TELL NURSE HE WAS NOT FINISHED, THEN PROMPTLY FEEL
BACK ASLEEP. VITALS STABLE. UNSAFE TO SWALLOW AT TIMES TIME RELATED TO
DROWSINESS.

## 2022-07-12 LAB
ALBUMIN SERPL BCP-MCNC: 2.8 G/DL (ref 3.4–5)
ALBUMIN/GLOB SERPL: 0.9 {RATIO} (ref 0.8–1.8)
ALT SERPL W P-5'-P-CCNC: 52 U/L (ref 12–78)
ANION GAP SERPL CALCULATED.4IONS-SCNC: 7 MMOL/L (ref 6–16)
AST SERPL W P-5'-P-CCNC: 40 U/L (ref 12–37)
BASOPHILS # BLD AUTO: 0 K/MM3 (ref 0–0.23)
BASOPHILS NFR BLD AUTO: 0 % (ref 0–2)
BILIRUB SERPL-MCNC: 0.2 MG/DL (ref 0.1–1)
BUN SERPL-MCNC: 36 MG/DL (ref 8–24)
CALCIUM SERPL-MCNC: 10 MG/DL (ref 8.5–10.1)
CHLORIDE SERPL-SCNC: 104 MMOL/L (ref 98–108)
CO2 SERPL-SCNC: 26 MMOL/L (ref 21–32)
CREAT SERPL-MCNC: 1.3 MG/DL (ref 0.6–1.2)
DEPRECATED RDW RBC AUTO: 49.6 FL (ref 35.1–46.3)
EOSINOPHIL # BLD AUTO: 0.03 K/MM3 (ref 0–0.68)
EOSINOPHIL NFR BLD AUTO: 1 % (ref 0–6)
ERYTHROCYTE [DISTWIDTH] IN BLOOD BY AUTOMATED COUNT: 14.6 % (ref 11.7–14.2)
GLOBULIN SER CALC-MCNC: 3.1 G/DL (ref 2.2–4)
GLUCOSE SERPL-MCNC: 136 MG/DL (ref 70–99)
HCT VFR BLD AUTO: 36 % (ref 37–53)
HGB BLD-MCNC: 11.5 G/DL (ref 13.5–17.5)
IMM GRANULOCYTES # BLD AUTO: 0.02 K/MM3 (ref 0–0.1)
IMM GRANULOCYTES NFR BLD AUTO: 1 % (ref 0–1)
LYMPHOCYTES # BLD AUTO: 0.86 K/MM3 (ref 0.84–5.2)
LYMPHOCYTES NFR BLD AUTO: 29 % (ref 21–46)
MCHC RBC AUTO-ENTMCNC: 31.9 G/DL (ref 31.5–36.5)
MCV RBC AUTO: 91 FL (ref 80–100)
MONOCYTES # BLD AUTO: 0.38 K/MM3 (ref 0.16–1.47)
MONOCYTES NFR BLD AUTO: 13 % (ref 4–13)
NEUTROPHILS # BLD AUTO: 1.71 K/MM3 (ref 1.96–9.15)
NEUTROPHILS NFR BLD AUTO: 57 % (ref 41–73)
NRBC # BLD AUTO: 0 K/MM3 (ref 0–0.02)
NRBC BLD AUTO-RTO: 0 /100 WBC (ref 0–0.2)
PLATELET # BLD AUTO: 170 K/MM3 (ref 150–400)
POTASSIUM SERPL-SCNC: 4.7 MMOL/L (ref 3.5–5.5)
PROT SERPL-MCNC: 5.9 G/DL (ref 6.4–8.2)
SODIUM SERPL-SCNC: 137 MMOL/L (ref 136–145)

## 2022-07-12 NOTE — NUR
LOW BP: LATE ENTRY:
PATIENT'S SBP THIS MORNING IS IN THE HIGH 80S AND LOW 90S. PATIENT WAKES TO
NAME AND WILL ANSWER SIMPLE YES/NO QUESTIONS. HR STABLE IN THE MID 80S.
NOTIFIED DR. WILKS. NEW ORDERS.

## 2022-07-12 NOTE — NUR
END OF SHIFT SUMMARY:
PATIENT VERY DROWSY FOR THE MAJORITY OF THE DAY. PATIENT UNABLE TO EAT
BREAKFAST OR LUNCH RELATED TO DROWSINESS. BY LATE AFTERNOON, PATIENT
INCREASINGLY AWAKE. PATIENT ABLE TO TAKE A SHOWER, SITTING IN THE CHAIR WITH
SOME ASSISTANCE.
PATIENT ABLE TO EAT DINNER WITH TRAY SET UP. PATIENT CALM AND COOPERATIVE WITH
CARE.
PATIENT'S STRENGTH IMPROVED DURING THE SHIFT. IN THE MORNING, PATIENT UNABLE
TO HOLD SELF SITTING AT THE BEDSIDE. BY THE AFTERNOON, PATIENT ABLE TO
AMBULATE TO THE BATHROOM WITH FWW AND ONE ASSIST.

## 2022-07-13 NOTE — NUR
SUMMARY: PT BEGAN SHIFT A/O TO SELF AND FAMILY. HE WAS UP IN RECLINER AND
ABLE TO SPECIFY SOME NEEDS APPROPRIATELY W/GARBLED SPEECH. PT WAS T/F'D TO BED
W/1PA AND SLEPT MOST OF NOCTE FOLLOWING HS SEROQUEL. HE REMAINS DROWSY BUT
WAKEFUL FOR CARE AND BATHROOM ASSIST. ATTENDS CHANGED PRN FOR INCONTINENCE AND
URINAL ASSIST PROVIDED PRN. HE'S BEEN PLEASANT AND COOPERATIVE W/CARE,
FOLLOWING INSTRUCTIONS AND TOOK MEDS W/O DIFFICULTY. PT DENIED PAIN AND ALL
OTHER COMPLAINTS. OCCASIONAL DRY COUGH HEARD BUT NO RESP DISTRESS OBSERVED.
VSS/AFEBRILE, NO ACUTE CHANGES. WCTM AND REPORT TO DAY RN.

## 2022-07-13 NOTE — NUR
MOIST/HACKING COUGH
FAMILY CONCERNED ABOUT PTS COUGH AND STATED THE PT CHOKED ON SOME PHLEM. LLL
NOTED TO HAVE SOME CRACKLES. DR. WILKS NOTIFED & ORDER FOR AUGMENTIN
BID, IS, AND FLUTTER THERAPY OBTAINED

## 2022-07-13 NOTE — NUR
SHIFT SUMMARY
PT STARTED ON ABX TODAY. PT FOUND TO HAVE CRACKLES IN HIS LLL AND COUGHING UP
SOME YELLOW SPUTUM. DR. WILKS AWARE. PT WIFE UPDATED ON PLAN OF CARE
TODAY. PT UP TO CHAIR FOR PART OF THE DAY. BACK TO BED NOW. CALL LIGHT IN
REACH. PT AWAITING PLACEMENT.

## 2022-07-14 NOTE — NUR
PATIENT IS ALERT AND ORIENTED TO SELF AND FAMILY AT TIMES. IMPULSIVE AND
DIFFICULT TO REDIRECT. PATIENT BECOMES AGITATED WITH STAFF AND THREATENS TO
HIT. CONTINENT/INCONTINENT OF BLADDER, ATTENDS IN PLACE. ST EVALUATED PATIENT
TODAY AND CHANGED DIET TO PUREE. PATIENT SPOKE WITH HIS DAUGHTER OVER THE
PHONE ALTHOUGH HIS DAUGHTER SAID HE WASNT MAKING SENSE, THE PATIENT WAS HAPPY.
UP TO RECLINER THIS MORNING. WILL CONTINUE TO MONITOR

## 2022-07-15 NOTE — NUR
DR. MARIEE WAS IN THE PATIENT'S ROOM ASSESSING THE PATIENT AT THE TIME HIS BP
WAS 83/65 MAP 65. ORDERS GIVEN TO HOLD MEDICATIONS

## 2022-07-15 NOTE — NUR
Brief visit this AM. Pt was placed on comfort care. Pt resting in bed with his
eyes closed upon arrival. Pt briefly opens his eyes to verbal stimuli and when
asked how he is feeling Pt states "Im sleepy". Ended visit to allow Pt to
rest. Pt appears comfortable with no S/S of distress at this time.
 
Spoke with Primary RN Umm and discussed case.
 
Palliative Care will remain available for supportive visits.

## 2022-07-15 NOTE — NUR
DR. MARIEE NOTIFIED OF PATIENT'S BP OF 83/65 MAP 65 AND HR 98 BPM. PATIENT IS
SITTING UP IN RECLINER.

## 2022-07-15 NOTE — NUR
SHIFT SUMMARY
NO ACUTE CHANGES THIS SHIFT. VSS, AOX1-SELF ONLY. CAN BE AGITATED OR
IMPULSIVE. DENIES PAIN, N/V OR DYSPNEA. AWAITING PLACEMENT. CALL LIGHT IN
REACH. WILL MONITOR.

## 2022-07-16 NOTE — NUR
SHIFT SUMMARY
COMFORT CARE. NO ACUTE CHANGES THIS SHIFT. AOX1-SELF & FOLLOWING SIMPLE
DIRECTIONS. VERY FORGETFUL, HX DEMENTIA. DENIES PAIN, N/V OR DYSPNEA. 1 P
ASSIST c FWW TO RESTROOM. IMPULSIVE & BED ALARM IN PLACE FOR SAFETY. CALL
LIGHT IN REACH. WILL MONITOR.

## 2022-07-16 NOTE — NUR
PATIENTS WIFE HERE TODAY. SHE TOOK SOME OF HIS DIRTY LAUNDRY HOME TO WASH. SHE
SPENT SOME TIME SPEAKING WITH THE PROVIDER. WIFE IS VISIBLY UPSET WATCHING
 BECOME UNLIKE HIMSELF.
PATIENT TOOK TYLENOL AFTER TELLING HIS WIFE THAT HIS SHOULDERS WERE SORE-
AROUND 1130PM. HE HAS HAD NO OTHER COMPLAINTS OF PAIN.

## 2022-07-16 NOTE — NUR
PATIENT WAS ANXIOUS THIS SHIFT, CONFUSED SHIFTING FROM CHAIR TO BED AND
ATTEMPTING TO GET UP WITHOUT CALLING. CONTINUES TO BE ON COMFORT CARE. HE WAS
GIVEN PRN SEROQUEL TWICE THIS SHIFT FOR AGITATION. SEEMS TO BE A LITTLE
EFFECTIVE, BUT NOT MUCH.

## 2022-07-17 NOTE — NUR
SHIFT SUMMARY
PT AxOx1-2. COOPERATIVE WITH CARE THIS SHIFT. WIFE IN ROOM FOR UPDATE AND
VISIT. WIFE ASSISTING WITH MEALS. PT ON COMFORT CARE WITH DISCHARGE PENDING
PLACEMENT IN MEMORY CARE ON HOSPICE. PT DENIES PAIN THIS SHIFT. SLEEPING ON
AND OFF T/O THE DAY. PSYCH MEDS ADJUSTED BY DOCTOR. PT CURRENTLY RESTING IN
BED SLEEPING. CALL LIGHT IN REACH.

## 2022-07-17 NOTE — NUR
SHIFT SUMMARY
COMFORT CARE. NO ACUTE CHANGES THIS SHIFT. AWAKENS TO VERBAL STIMULI. DENIES
PAIN, N/V, OR DYSPNEA. BED ALARM & CALL LIGHT IN PLACE. WILL MONITOR.

## 2022-07-18 NOTE — NUR
SHIFT SUMMARY
NO ACUTE CHANGES THIS SHIFT. COMFORT CARE. AGITATED, IMPULSIVE @TIMES, HOWEVER
HAS BEEN MORE PLEASENT TONIGHT THEN PREVIOUS NIGHT. RESPONDS TO VERBAL
STIMULI. AOX1-SELF ONLY. CAN FOLLOW SOME SIMPLE DIRECTIONS.  DENIES PAIN, N/V
OR DYPNEA. REPOSITIONED, CHANGED PRN. BED ALARM IN PLACE. WILL MONITOR.

## 2022-07-18 NOTE — NUR
PT MOSTLY AGREEABLE TODAY. DID SEE WIFE IN ROOM FOR SOME TIME TODAY. PLEASANT
VISIT. STATES LOST GLASSES. DID NOT LOCATE. PT CONTINUES TO EAT SOME. TAKING
PILLS WITH APPLESAUCE. PENDING PLACEMENT. BED IN LOW POSITION, CALL LITE IN
REACH, BED ALARM ON FOR SAFETY

## 2022-07-19 NOTE — NUR
PT AGITATED AND NOT ALLOWING STAFF TO CHANGE HIS SOILED ATTENDS. GAVE PRN
SEROQUEL FOR AGITATION AND PT ALLOWED STAFF TO CHANGE HIM.

## 2022-07-19 NOTE — NUR
SHIFT SUMMARY
NO ACUTE CHANGES THIS SHIFT. COMFORT CARE. AOX2-SELF, HOSPITAL, TOWN. UNAWARE
DATE OR SITUATION, HOWEVER COULD ANSWER MORE QUESTIONS CORRECTLY TONIGHT. UP
1 ASSIST c FWW GB AMBULATING ARROYO @BEGINNING OF SHIFT. DENIES PAIN, N/V OR
DYSPNEA. HAS BEEN CALM, COOPERATIVE, NOT AGITATED SO FAR THIS SHIFT. SLEPT
SOUNDLY T/O NIGHT. AWAITING PLACEMENT. WILL MONITOR.

## 2022-07-19 NOTE — NUR
SHIFT SUMMARY
NO ACUTE CHANGES THIS SHIFT. PT IS CONFUSED AND AT TIMES CAN BE AGITATED. PRN
SEROQUEL GIVEN FOR AGITATION WITH GOOD EFFECT. PT REMAINS ON COMFORT CARE.
WILL REPORT TO JOSÉ MONTGOMERY.

## 2022-07-19 NOTE — NUR
PT AGITATED AND REFUSING TO LET STAFF CHANGE HIS ATTENDS. GIVEN SOME SEROQUEL
FOR AGITATION AND EVENTUALLY ALLOWED STAFF TO CHANGE HIS ATTENDS.

## 2022-07-20 NOTE — NUR
PT WANTED TO FINISH APPLESAUCE, AFTER MEDS GIVEN. PT STATING HE'S READY TO GO
TO BED. GOT PT REPOSIONED FOR SLEEP.

## 2022-07-20 NOTE — NUR
SHIFT SUMMARY
NO ACUTE CHANGES THIS SHIFT. PT CONTINUES TO AWAIT PLACEMENT AND IS ON COMFORT
CARE. TREATED FOR AGITATION PER EMR. WILL REPORT TO NOC RN.

## 2022-07-20 NOTE — NUR
PT AGITATED AND REFUSING TO HAVE BRIEF CHANGED. GAVE PT SEROQUEL FOR THE
AGITATION AND WILL ATTEMPT AGAIN.

## 2022-07-21 NOTE — NUR
DAY SHIFT SUMMARY
82 YR OLD MALE PT WAITING FOR PLACEMENT IN [facility]ARE. PT IS COMFORT CARE.
INDEPENDENT WITH MOVEMENTS/CHANGING POSITIONS. 1 ASSIST WITH WALKER TO WALK
FROM BED TO CHAIR OR TO BATHROOM. PT IS CONFUSED AND A/O TO SELF ONLY. PT ON
RA AND TAKES MEDS CRUSHED IN APPLE SAUCE. CALL LIGHT WITHIN REACH BUT NOT ABLE
TO CALL APPROPRIATELY. FREQUENT ROUNDING. NO ACUTE CHANGES THIS SHIFT.

## 2022-07-22 NOTE — NUR
COMFORT CARE- PT NEEDED TO VOID. ASSISTED PT USING URINAL. PT CLEANED AND
CHANGED BRIEF. PT TUCKED BACK INTO BED. PT BACK TO SLEEP.

## 2022-07-22 NOTE — NUR
COMFORT CARE-
PT AT CHAIR. PT IN PLEASANT MOOD. PT DENIES ANY PAIN OR N/V. PT HAD A SNACK OF
ICE CREAM. PT SOMEWHAT IMPULSIVE, SETTING OFF CHAIR ALARM ONCE. PT STATES
HE'LL LAY DOWN IN A LITTLE WHILE.

## 2022-07-22 NOTE — NUR
DAY SHIFT SUMMARY
 
82 YR OLD MALE PT TESTED POSITIVE FOR COVID LAST SHIFT. PT ON ENHANCED ISO
PRECAUTIONS. 1 ASSIST WITH WALKER, IMPULSIVE, A/O TO SELF. MEDS CRUSHED IN
APPLESAUCE. CONT/INCONT. PT'S WIFE HOME SICK WITH COVID. CALLED YESTERDAY TO
STATES SHE WAS HOME SICK AND RELAYED HER SYMPTOMS PROMPTING US TO TEST THE PT
SEEING AS HOW HE HAS DEVELOPED A COUGH. CALL LIGHT WITHIN REACH BUT UNABLE TO
CALL APPROPRIATELY. WAITING PLACEMENT. PT IS COMFORT CARE AND ABLE TO CHANGE
OWN POSITIONS HIMSELF.

## 2022-07-23 NOTE — NUR
SHIFT SUMMARY
PT SLEEPING AT START OF SHIFT AND DID NOT WANT TO GET UP FOR BREAKFAST. PT
AWAKE FOR LUNCH AND ASSISTED TO CHAIR AT BS. PT ABLE TO FEED HIMSELF.
INCONTINENT OF BLADDER THIS AM WHEN SLEEPING. PT CLEANED AND CHANGED WHEN UP
TO CHAIR. BED LINENS CHANGED. PT REMAINS IN ISOLATION FOR COVID, AND UNABLE TO
BE D/C'D TO MEMORY CARE YET. PT IS ON COMFORT CARE WAITING TO D/C ON HOSPICE
WHEN BED AVAILABLE. WIFE CALLED TODAY FOR UPDATE AS SHE ALSO HAS COVID AND IS
UNABLE TO VISIT. WILL CONTINUE TO MONITOR. CALL LT IN REACH.

## 2022-07-23 NOTE — NUR
END OF SHIFT NOTE
PT AGGITATED AT START OF SHIFT, GETTING UP AND DOWN. PT GETS AGGITATED BY
ALARMS AND ASSISTANCE. ATIVAN GIVEN AT BEGGINING OF SHIFT. PT SLEPT ALL NIGHT.
LAST DOCUMENTED BM 7/15, PT GIVEN PRN BOWEL MEDICATIONS WITH PRUNE JUICE.

## 2022-07-23 NOTE — NUR
Comfort Care Visit
 
Pt resting in bed with eyes closed. Spoke with Primary RN Marcia. No concerns
reported at this time. Pt appears comfortable at this time.
 
Palliative Care will remain available.

## 2022-07-24 NOTE — NUR
END OF SHIFT NOTE
PT HAD LARGE LOOSE INCONTINENT STOOL. PT CLEANING UP IN THE SHOWER WITH
MODERATE ASSISTANCE AND BED CHANGED. PT SLEPT THROUGH THE NIGHT WITH NO
ADDITIONAL PRNS GIVEN.

## 2022-07-24 NOTE — NUR
SHIFT SUMMARY
NO ACUTE CHANGES TO PRESENT THIS SHIFT. PT SLEEPING AT START OF SHIFT, BUT
WOKE EASILY FOR BREAKFAST TODAY. UP TO CHAIR FOR MEALS. PT INCONTINENT OF
BOWELS AGAIN THIS AM. ASSISTED TO BTHRM AND THEN TO SHOWER. LRG AMT OF LOOSE
BRWN STOOL OUT THIS MORNING. PT NO LONGER CONSTIPATED. PT CLEANED AND ASSISTED
BACK TO CHAIR. PT REMAINED IN CHAIR UNITL AFTER LUNCH AND THEN UP TO BTHRM
AGAIN BEFORE GOING TO BED AND TAKING A NAP. PT HAS BEEN CO-OP WITH CARE TODAY
SO FAR, BUT DID NOT WANT TO USE HIS WALKER WHEN AT FIRST GETTING BACK TO BED
THIS AFTERNOON. PT REMAINS UNSTEADY W/O ASSISTANCE. NO C/O PAIN. WIFE CALLED
TO CK ON PT AGAIN TODAY; UPDATE GIVEN. BED ALARM ON FOR SAFETY. CALL LT IN
REACH.

## 2022-07-25 NOTE — NUR
Pt appears to be resting comfortably, lying in bed. Bedside RN reports pt is
no longer able to feed himself. No changes needed to care plan.

## 2022-07-25 NOTE — NUR
SHIFT SUMMATRY;  GISEL CANNOT FEED HIMSELF TODAY.  HE PUTS HANDS IN FOOD AND
MOVES IT AROUND ON HIS TRAY.  HE IS UNABLE TO DRINK FROM HIS SIPPY CUP EVEN IF
HE IS HANDED IT AND PLACED UP TO HIS LIPS.  TODD CASTILLO FEEDS HIM NOON AND
GARY MEALS.
 
GISEL VERY SEDATE AT BEGINNING OF SHIFT AND SLEEPS SOUNDLY UNTIL EARLY
AFTERNOON.
 
VITAL SIGNS ARE WNL. SPOUSE CALLS TO CHECK ON GISEL AND SHE WILL CALL AGAIN
TO SEE HIS PROGRESS TOMORROW.

## 2022-07-26 NOTE — NUR
SHIFT SUMMARY NOC: PT INITIALLY REFUSED BEDTIME MEDICATIONS. PT WAS AGITATED
STATING "I'M GOING TO GET MY GUN TO GO HUNTING." PT DID TAKE BEDTIME SEROQUEL
AND SLEPT MOST OF NIGHT. ATTEMPTS WERE MADE TO TURN PT BUT HE BECAME AGITATED
SO HE WAS LEFT ALONE. NO PRN COMFORT CARE MEDICATIONS GIVEN.

## 2022-07-26 NOTE — NUR
SHIFT SUMMARY;  PATIENT SLEPT SOUNDLY MOST OF DAY. ONLY ROUSING TOWARDS
EVENING APPROX [id number].  HE WAS GIVEN A BEDBATH AND THEN SPOKE WITH HIS DAUGHTER
ON THE PHONE.
 
PATIENT IS CURRENTLY TRYING TO GET OUT OF BED. HOWEVER IS NOT COMBATIVE OR
AGITATED. HE IS REDIRECTED AND PLACED BACK IN BED. PATIENT REFUSES TO SIT IN
RECLINER AND REFUSES TO AMBULATE TO THE BATHROOM OR GO TO THE BEDSIDE COMMODE.
 
CALL LIGHT IN REACH. TV ON.  LIGHT ARE ON.  PATIENT REMAINS IN COVID
PRECAUTIONS.

## 2022-07-27 NOTE — NUR
SHIFT SUMMARY;  PATIENT RECEIVED BED BATH TODAY. HE WAS TURNED EVERY TWO HOURS
AT LEAST AND WAS FED ALL MEALS.  PATIENT DID NOT TRY TO GET OUT OF BED TODAY.
HE IS CURRENTLY SITTING UP IN BED WATCHING TV.

## 2022-07-27 NOTE — NUR
SHIFT SUMMARY NOC: PT TOOK BEDTIME MEDICATIONS WITHOUT ISSUE. PT HAS SLEPT ALL
NIGHT. PT INCONTINENT OF URINE, BRIEF IN PLACE. NO COMFORT CARE PRN
MEDICATIONS GIVEN.

## 2022-07-28 NOTE — NUR
SHIFT SUMMARY NOC: PT SLEPT ALL NIGHT. WHEN CHANGING AND REPOSITIONING PT HE
GETS AGITATED AND THREATENS TO HIT STAFF. PERIODICALLY THROWS LEGS OFF BED BUT
TO WEAK TO GET OUT. NO PRN COMFORT CARE MEDICATIONS GIVEN.

## 2022-07-28 NOTE — NUR
SHIFT SUMMARY
PATIENT COMFORT CARE. A&O TO SELF. FREQUENTLY ATTEMPTING TO GET OUT OF BED.
INCONT OF BOWEL AND BLADDER. BECOMES AGITATED DURING ATTENDS CHANGES. SPOKE TO
WIFE ON PHONE TODAY. NO C/O PAIN, SOB. WILL CONTINUE TO MONITOR.

## 2022-07-28 NOTE — NUR
Pt is on comfort care, but has not needed any pain and/or anxiety medication
recently. He appears to be calm, much more than upon his initial arrival.
Palliative to continue to follow.

## 2022-07-29 NOTE — NUR
SHIFT SUMMARY
COMFORT CARE PATIENT. A&O TO SELF AND FAMILY. DENIES ANY PAIN OR SOB. BED
ALARM AND CHAIR ALARM ON AS PATIENT ATTEMPTS TO GET OUT OF BED AND CHAIR T/O
SHIFT. SPOKE TO WIFE ON PHONE. PATIENT STRUGGLES TO FIND HIS WORDS AND EASILY
GET FRUSTRATED. AWAITING PLACEMENT.  WILL CONTINUE TO MONITOR.

## 2022-07-30 NOTE — NUR
NIGHT SHIFT SUMMARY
 
REMAINS ON COMFORT CARE. DENIED PAIN AND DISCOMFORT AS HE WAS ASSISTED TO BED
FROM BEDSIDE CHAIR. TOLERATED HS MEDS WELL WITH HOB ELEVATED. HAS BEEN RESTING
QUIETLY SINCE WITH FEW INTERRUPTIONS SINCE. ON CAMERA FOR SAFETY. CALL LIGHT
IN REACH. ISOLATION PRECAUTIONS FOR COVID IN EFFECT.

## 2022-07-30 NOTE — NUR
AWAKE. LEANING OUT OF BED, TRYING TO PUT PHONE ON TABLE. ASSISTED BACK INTO
BED. RAILS UP. TABLE IN REACH FOR PT ACCESS. BED ALARM ON. CAMERA ON FOR
SAFETY. CALL LIGHT IN REACH.

## 2022-07-30 NOTE — NUR
SHIFT SUMMARY
PTN COMFORT CARE. RESTING QUIETLY MOST OF DAY. UP TO CHAIR FOR SOME TIME.
REPOSITIONED Q2 HR. ON CAMERA FOR SAFETY. DENIED PAIN OR DISCOMFORT.
PRECAUTIONS FOR COVID IN PLACE. TOLERATED MEDS WELL WITH HOB ELEVATED. CALL
LIGHT IN REACH.

## 2022-07-31 NOTE — NUR
NIGHT SHIFT SUMMARY
 
REMAINS ON COMFORT CARE. CALLED DAUGHTER AROUND HS, THEN WIFE CALLED BACK,
VOICED CONCERNS RE  THAT HE SAID HE WAS "TIED UP" AND THAT HE WAS
RESTRAINED. HOWEVER, WAS NOT TIED UP. WIFE VOICED DIFFICULTIES DEALING WITH
PTS BEHAVIOR AND DETERIORATION IN THOUGHT PROCESSES. FEELINGS ACKNOWLEDGED AND
SUPPORT VOICED. STATED SHE WOULD CALL THE AM NURSE AROUND 10 AM RE MD
SUGGESTIONS RE PT CARE. HAS BEEN RESTING QUIETLY WITH FEW INTERRUPTIONS SINCE.
CALL LIGHT IN REACH. ON CAMERA FOR SAFETY.

## 2022-07-31 NOTE — NUR
SHIFT REPORT
PTN CONTINUES ON COMFORT CARE. PRESENTED WITH NO PAIN OR DISCOMFORT, RESTING
QUIETLY MUCH OF DAY. UP TO CHAIR FOR DINNER. USE OF URINAL AT BEDSIDE.
REPOSITIONED Q2 HR. ON CAMERA FOR SAFETY. CALL LIGHT IN REACH. ISOLATION
PRECAUTIONS FOR COVID IN EFFECT.

## 2022-08-01 NOTE — NUR
AM ASSESSMENT-
PT COMFORT CARE PT. PT HAS BEEN SLEEPING MOST OF THEM AM, PT AWAKES TO VERBAL
STIMULI. ORIENTED TO SELF. PT DENIES ANY COMPLAINTS. LS CLEAR, ON RA. MEPILEX
NOTED TO COCCYX AND LEFT ELBOW. RASH NOTED TO LEFT LOWER BACK/ SIDE. PT
REPOSITIONED AT THIS TIME. NO NEEDS AT THIS TIME.

## 2022-08-01 NOTE — NUR
NIGHT SHIFT SUMMARY
 
SOMEWHAT AGGRESSIVE AT SHIFT COMMENCE WHEN STAFF ASSISTED HIM TO BED.
HOWEFVER, AFTER TAKING HS MEDS (INCLUDING SEROQUEL), SEEMED MORE COOPERATIVE
AND WENT QUIETLY TO SLEEP AFTER PHONE CALL WITH FAMILY. REMAINS ON COMFORT
CARE. NO NOTED ACUTE DISTRESS SINCE SHIFT START. NO NOTED S/S PAIN. CALL LIGHT
IN REACH. ISOLATION PRECAUTIONS CONTNUE. CALL LIGHT IN REACH.

## 2022-08-01 NOTE — NUR
SHIFT SUMMARY-
COMFORT CARE PT. PT A/O TO SELF. NO COMPLAINTS T/O THE DAY. LS CLEAR, ON RA.
PT INCONT/CONT. PT NOTED TO HAVE A RASH TO LEFT SIDE THIS AM, BY AFTERNOON IT
HAS SPREAD TO RIGHT SIDE AND SMALL SPOTS TO LEGS. RASH PENCIL TO DIME SIZE RED
SPOTS, PT DENIES ANY PAIN TO THE AREA. DR MARIEE NOTIFIED, PETROLEUM JELLY
ORDERED AT THIS TIME. PT AWAITING HOSPICE PLACEMENT AT THIS TIME. NO OTHER
ACUTE CHANGES THIS SHIFT.

## 2022-08-02 NOTE — NUR
SHIFT SUMMARY-
PT ON COMFORT CARE. PT A/O TO SELF ONLY. PT UP TO CHAIR FOR MOST OF THE DAY,
1 ASSIST UP. PT DENIES ANY COMPLAINTS T/O THE DAY. LS CLEAR, ON RA. RASH
CONTINUES AROUND BACK, LEFT THIGH AND SMALL SPOTS ON LEGS, CREAMS APPLIED AND
PHOTOS TAKEN, PT DENIES ANY PAIN OR IRRITATION TO SITE. PT WITH GOOD APPETITE
TODAY. INCONT/CONT OF URINE, SENNA LAX GIVEN FOR BM BUT NO RESULTS YET. PT
AWAITING HOSPICE PLACEMENT. NO OTHER ACUTE CHANGES THIS SHIFT.

## 2022-08-02 NOTE — NUR
SHIFT SUMMARY
ADMITTED FOR SI - RESOLVED. DNR CODE. ENHANCED PRECAUTIONS FOR COVID+. COMFORT
CARE. PLAN IS FOR PLACEMENT. 1 ASSIST TO BSC. INCONTINENT/CONTINENT. ON RA.
PUREE DIET - SUPERVISED. HONEY THICK LIQUIDS. RX CRUSHED IN APPLESAUCE. [place].
IMPULSIVE AND CONFUSED. BED AND CHAIR ALARMS IN PLACE

## 2022-08-02 NOTE — NUR
AM ASSESSMENT-
PT COMFORT CARE PT. PT AWAKE AND EATING BREAKFAST THIS AM. A/O TO SELF ONLY,
PLEASANT AND COOPERATIVE. LS CLEAR, ON RA. PT DENIES ANY COMPLAINTS. RASH TO
BACK, LEFT THIS AND SMALL SPOTS ON LEGS NOTED. DR MARIEE EVALUATED AND CREAMS
ORDERED, PHOTO TAKEN.

## 2022-08-03 NOTE — NUR
spoke with pt wife, wife concerned about pt jargin/+expression of returning
home. wife expressed her concern over medication (mainly seroquel) explained
to her that pt is pleasant today and took meds as prescribed. wife said she
would visit after she test negative and will call to check in tomorrow.

## 2022-08-03 NOTE — NUR
SHIFT SUMMARY-
PT PLEASANT TODAY. PT TOOK MEDS WITH APPLESAUCE. VSS. PT SLEPT DURING MOST OF
SHIFT. APPETITE GOOD. NO ACUTE CHANGES. PT RESTING NOW WITH CALL LIGHT IN
REACH, BED ALARM ON, AND SIDE RAILS UP.

## 2022-08-03 NOTE — NUR
SHIFT SUMMARY
ADMITTED FOR AMS. DNR CODE. PLAN IS FOR HOSPICE PLACEMENT. COMFORT CARE. 1
ASSIST TO BSC. A&O X1-2, CONFUSED. SUPERVISED MEALS. PUREE DIET/HONEY THICK
LIQUIDS. RX CRUSHED W/APPLESAUCE. CONTINENT/INCONTINENT. ON RA. [place].
MONITORING RASH ON LEFT SIDE OF BACK, CREAMS ARE PRESCRIBED. NO NEW CONCERNS
THIS SHIFT.

## 2022-08-03 NOTE — NUR
Comfort Care: Pt appears relaxed, no s/s of pain or anxiety during our visit.
No changes to plan of care at this time.

## 2022-08-04 NOTE — NUR
SHIFT SUMMARY
PT AXO TO SELF AND FOLLOWIGN DIRECTIONS THOUGH SLOW TO RESPOND. PT UP TO
RECLINER THIS SHIFT. ON COMFORT CARE. DENIES PAIN, SOB AND NV. GOOD APPETITE
TODAY. PT'S WIFE CALLED AND ASKED HOW PATIENT WAS DOING AND ASKED WHEN HIS
LAST BM WAS. PER THE CHART, THE LAST BM WAS ON 7/30. PT MEDICATED PER EMAR
WITH MILK OF MAG. PT PLEASANT AND COOPERATIVE.
BED IN LOW POSITION, CALL LIGHT WITHIN REACH. CHAIR ALARM ON. There are no preventive care reminders to display for this patient. Patient is up to date, no discussion needed.

## 2022-08-04 NOTE — NUR
Comfort Care Visit
 
Pt sitting in recliner chair looking at what appears to be family pictures. Pt
denies pain and appears comfortable with no S/S of distress at this time. Pt
appears to struggle with responses and verbal conversation.
 
Spoke with Primary RN Anna and discussed case. No concerns reported at this
time.
 
Palliative Care will remain available

## 2022-08-04 NOTE — NUR
SUMMARY: PT A/O TO SELF/FAMILY AND WAS PLEASANT AND COOPERATIVE W/CARE. HE'S
UP 2PA TO BSC/RECLINER. URINAL ASSIST PROVIDED AND ATTENDS CHANGED PRN FOR
INCONTINENCE. HE TOLERATES PILL W/APPLESAUCE AND HAS A GOOD APPETITE. PT
PREFERRED TO SLEEP IN RECLINER W/CHAIR ALARM ON FOR IMPULSIVITY AND FALL RISK.
NO ACUTE CHANGES. COMFORT CARE MAINTAINED. WCTM AND REPORT TO DAY RN.

## 2022-08-05 NOTE — NUR
Comfort Care Visit
 
Pt resting in bed upon arrival. Pt attempts verbal communication with little
success. Pt appears comfortable with no S/S of distress at this time. Offered
gentle voice and touch.
 
Spoke with Primary RN Octaviano and discussed case. No concerns reported at this
time.

## 2022-08-05 NOTE — NUR
PLEASANT TO CARE, MAKES NEEDS KNOWN, MINIMAL VERBAL INTERACTIONS, ATE ALL
MEALS, DENIES N/V, LS DIM BASES, CLEARS CONGESTION WITH COUGH, NO SOB, PATIENT
IMPULSIVE TO TRASNFER, BED AND CHAIR ALARMS ON, DR LOFTON CONTACTED WIFE TODAY
VIA PHONE, WIFE GENE STILL RECOVERING FROM COVID AT HOME, PATIENT COMFORT
CARE,  8/4, DOESNT USE CALL LIGHT, CALL LIGHT WITH IN REACH, WILL RELAY TO
PM RN, WCTM

## 2022-08-06 NOTE — NUR
PT IN BED. TOOK MEDS CRUSHED IN PUDDING. TOLERATED HONEY THICKEN LIQUID
WITHOUT DIFFICULTY. PT SMILING AT STAFF. HAS PHONE UP TO EAR. TV TURNED ON PER
PT REQUEST. NO OTHER NEEDS. BED ALARM ON. CALL LT IN REACH.

## 2022-08-06 NOTE — NUR
PT SET ALARM OFF ON BED. CNA ASSISTED PT TO WALK ABOUT IN THE ARROYO FOR A BIT.
PT BACK IN BED. BED ALARMED.

## 2022-08-06 NOTE — NUR
REPORT FROM PM RN, PATIENT IN NO DISTRESS, SNORING IN ROOM, CALL LIGHT WITH IN
REACH, BED ALARM ON, WCTM

## 2022-08-06 NOTE — NUR
SLOW TO RESPOND, MAKES NEEDS KNOWN, LOWER VOICE, ATE ALL FOOD, IMPULSIVE WITH
TRANSFERS, HAS NOT USED HIS CALL LIGHT GOR HELP, BED/CHAIR ALARMS ON, NO ACUTE
CHANGES, CASE MANAGEMENT WORKING ON DISCHARGE PLAN FOR HOME OR SNF, NO ACUTE
CHANGES, VVS, LS CLEAR, CALL LIGHT WITH IN REACH, WILL RELAY TO PM RN, WCTM

## 2022-08-07 NOTE — NUR
MAKES NEEDS KNOWN, UNABLE TO MANUEVOR CALL LIGHT, CALLS OUT, CONITNENT, BUT
HAS URGENCE, ATTENDS ON CDI, SMEAR BM TODAY, PLEASANT TO ALL INTERACTIONS,
HELPS REPOSITION IN BED, 1-2 PERSON STAND BY ASSIST TO THE BR, BED/CHAIR
ALARMS ON, PATIENT IMPULSIVE TO GET UP TO USE BATHROOM, WILL RELAY TO PM RN,
WCTM

## 2022-08-07 NOTE — NUR
SHIFT SUMMARY:
COMFORT CARE PT. NO ACUTE CHANGES. PT HAS BEEN COOPERATIVE WITH CARE THIS
SHIFT. TOLERATED MED PASS. ON RA. AWAITING PLACEMENT TO LTC. WILL CONTINUE TO
PROVIDE CARE UNTIL SHIFT REPORT.

## 2022-08-07 NOTE — NUR
REPORT FROM PN ULISES VANG, PATIENT IN NO DISTRESS, SNORRING LOUDLY IN ROOM, NO
CHANGES TROUGH THE NIGHT, CALL LIGHT WITH IN REACH, BED ALARM ON, WCTM

## 2022-08-08 NOTE — NUR
Pt is going to [facility] tomorrow, and will be followed by
[facility]. This is a good plan for him, as he has been waiting for
placement for quite some time.

## 2022-08-08 NOTE — NUR
SHIFT SUMMARY:
COMFORT CARE PT. ALERT TO SELF. TOOKS MEDS WELL CRUSHED AND IN APPLESAUCE.
RESTED WELL DURING SHIFT. NO ACUTE CHANGES. AWAITING LTC PLACEMENT. WILL
CONTINUE TO PROVIDE CARE UNTIL SHIFT REPORT.

## 2022-08-08 NOTE — NUR
PT RESTING IN BED AT THIS TIME AWAKE APPEARS TO BE BREATHING EASILY. PT WAS UP
IN THE CHAIR TODAY FOR SEVERAL HOURS. THE PT WAS CALM AND COOPERATIVE TODAY.
CALL LIGHT IN REACH. BED ALARM ON

## 2022-08-09 NOTE — NUR
PT UP IN CHAIR FOR LUNCH SHOWER GIVEN BY THE CNA EARLIER PT TOLERATED WELL. PT
AWAITING TRANSPORT TO [facility]

## 2022-08-09 NOTE — NUR
PT DISCHARGED
ATTEMPT WAS MADE TO CALL THE [facility]ARE FACILITY PRIOR TO THE PATIENT BEING
DISCHARGED X3, THERE WAS ONLY A BUSY SIGNAL. THE CARE MANAGER ALSO ATTEMPTED
TO CALL THE NUMBER PROVIDED. THE PT WAS TRANSFERED VIA WHEELCHAIR. ALERT
BREATHING EASILY ON RA. BELONGINGS RELEASED TO THE PATIENT

## 2022-08-09 NOTE — NUR
SHIFT SUMMARY
A/O TO SELF. 1P ASSIST WITH FWW. DENIES PAIN OR SOB. COMFORT CARE MEASURES IN
PLACE. BED IN LOWEST POSITION WITH CALL LIGHT IN REACH. WILL CONTINUE TO
MONITOR AND REPORT TO ONCOMING RN.

## 2023-05-07 NOTE — NUR
SHIFT SUMMARY
PT IS A/O TO SELF AND SURROUNDINGS. COOPERATIVE BUT EASILY AGITATED. PT IS
WITHDRAWN AND SLOW TO RESPOND. HAS NOT NEEDED RESTRAINTS OR MEDICATION THIS
SHIFT.  PT UP TO THE CHAIR MOST OF THE DAY AND HAS AN UNSTEADY GAIT. HE IS A 1
ASSIST. DOES NOT USE CALL LIGHT. BED ALARM OR CHAIR ALARM IN PLACE AT ALL
TIMES. REMOTE CAMERA AS A HIGH FALL RISK. [[id number]],[[email or web address]_OB_FT:[id number]]

## 2023-11-01 NOTE — NUR
SHIFT SUMMARY
NO ACUTE CHANGES OVERNIGHT. PT STILL HAS MOIST COUGH. ABX WAS STARTED
YESTERDAY. AOXSELF. BED ALARM ON FOR SAFETY. SLEPT GOOD OVERNIGHT. IMPULSIVE
AND CONFUSE. PT GETS AGITATED AT TIMES WHEN ENCOURAGING TO USE FWW AND GB.
INCONTINENT AT TIMES. ATTENDS IN PLACE. CALL LIGHT WITHIN REACH. WILL NOTIFY
ONCOMING NURSE FOR AN UPDATE. Lethargic, arousable to verbal stimulus